# Patient Record
Sex: MALE | Race: WHITE | NOT HISPANIC OR LATINO | Employment: OTHER | URBAN - METROPOLITAN AREA
[De-identification: names, ages, dates, MRNs, and addresses within clinical notes are randomized per-mention and may not be internally consistent; named-entity substitution may affect disease eponyms.]

---

## 2019-03-07 ENCOUNTER — HOSPITAL ENCOUNTER (INPATIENT)
Facility: CLINIC | Age: 74
End: 2019-03-07
Payer: COMMERCIAL

## 2021-10-29 ENCOUNTER — APPOINTMENT (OUTPATIENT)
Dept: GENERAL RADIOLOGY | Facility: CLINIC | Age: 76
End: 2021-10-29
Attending: EMERGENCY MEDICINE
Payer: COMMERCIAL

## 2021-10-29 ENCOUNTER — HOSPITAL ENCOUNTER (EMERGENCY)
Facility: CLINIC | Age: 76
Discharge: HOME | End: 2021-10-29
Attending: EMERGENCY MEDICINE | Admitting: EMERGENCY MEDICINE
Payer: COMMERCIAL

## 2021-10-29 ENCOUNTER — APPOINTMENT (OUTPATIENT)
Dept: CT IMAGING | Facility: CLINIC | Age: 76
End: 2021-10-29
Attending: EMERGENCY MEDICINE
Payer: COMMERCIAL

## 2021-10-29 VITALS
DIASTOLIC BLOOD PRESSURE: 82 MMHG | RESPIRATION RATE: 18 BRPM | HEART RATE: 79 BPM | OXYGEN SATURATION: 97 % | SYSTOLIC BLOOD PRESSURE: 152 MMHG | TEMPERATURE: 98 F

## 2021-10-29 DIAGNOSIS — S00.03XA HEMATOMA OF SCALP, INITIAL ENCOUNTER: ICD-10-CM
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
## 2021-10-29 DIAGNOSIS — S09.90XA INJURY OF HEAD, INITIAL ENCOUNTER: ICD-10-CM

## 2021-10-29 PROCEDURE — 99285 EMERGENCY DEPT VISIT HI MDM: CPT | Mod: 25

## 2021-10-29 PROCEDURE — 73552 X-RAY EXAM OF FEMUR 2/>: CPT | Mod: RT

## 2021-10-29 PROCEDURE — 70450 CT HEAD/BRAIN W/O DYE: CPT

## 2021-10-29 PROCEDURE — 72125 CT NECK SPINE W/O DYE: CPT

## 2021-10-29 RX ORDER — GLIMEPIRIDE 1 MG/1
2 TABLET ORAL
COMMUNITY
Start: 2021-10-10

## 2021-10-29 RX ORDER — AMLODIPINE BESYLATE 5 MG/1
5 TABLET ORAL
Status: ON HOLD | COMMUNITY
Start: 2021-06-11 | End: 2023-01-11

## 2021-10-29 RX ORDER — BUPROPION HYDROCHLORIDE 300 MG/1
300 TABLET ORAL
COMMUNITY
Start: 2021-10-11

## 2021-10-29 NOTE — ED NOTES
76 year old male received in signout from Dr. Umanzor who presents to the ED w/ head injury status post fall on anticoagulation.  Please see primary note for full details. Imaging pending at time of signout resulted as noted below without significant acute traumatic abnormality. Patient ambulated in the emergency department without difficulty. Given his reassuring imaging and successful ambulation trial, at this time I feel the patient is safe for discharge.  Recommendations given regarding follow up with PCP and return to the emergency department as needed for new or worsening symptoms.  Counseled on all results, diagnosis and disposition. Patient understanding and agreeable to plan. Patient discharged in discharged condition.        XR Femur Right 2 Views   Final Result   IMPRESSION: Moderate right hip degenerative changes. No evidence of   acute fracture. Moderate knee degenerative changes. Ossific density   inferior to the patella may be calcification or ossification in the   patellar tendon versus old avulsion injury.      SHELIA SORTO MD            SYSTEM ID:  [id number]      Cervical spine CT w/o contrast   Final Result   IMPRESSION: There is normal alignment of the cervical vertebrae.   Vertebral body heights of the cervical spine are normal.   Craniocervical alignment is normal. There are no fractures of the   cervical spine.  Loss of disc space height and degenerative endplate   spurring at C5-C6. Mild-moderate facet arthropathy throughout cervical   spine. Mild degenerative spinal canal narrowing at C4-C5 and C5-C6.         Radiation dose for this scan was reduced using automated exposure   control, adjustment of the mA and/or kV according to patient size, or   iterative reconstruction technique      DUSTIN BARILLAS MD            SYSTEM ID:  [id number]      Head CT w/o contrast   Final Result   IMPRESSION: Small right frontal scalp hematoma/laceration. Diffuse   cerebral volume loss and cerebral white matter  changes consistent with   chronic small vessel ischemic disease. No evidence for acute   intracranial pathology.         Radiation dose for this scan was reduced using automated exposure   control, adjustment of the mA and/or kV according to patient size, or   iterative reconstruction technique      DUSTIN BARILLAS MD            SYSTEM ID:  [id number]                 Quincy Lima MD  10/29/21 8320

## 2021-10-29 NOTE — DISCHARGE INSTRUCTIONS
Discharge Instructions  Head Injury    You have been seen today for a head injury. Your evaluation included a history and physical examination. You may have had a CT (CAT) scan performed, though most head injuries do not require a scan. Based on this evaluation, your provider today does not feel that your head injury is serious.    Generally, every Emergency Department visit should have a follow-up clinic visit with either a primary or a specialty clinic/provider. Please follow-up as instructed by your emergency provider today.  Return to the Emergency Department if:  You are confused or you are not acting right.  Your headache gets worse or you start to have a really bad headache even with your recommended treatment plan.  You vomit (throw up) more than once.  You have a seizure.  You have trouble walking.  You have weakness or paralysis (cannot move) in an arm or a leg.  You have blood or fluid coming from your ears or nose.  You have new symptoms or anything that worries you.    Sleeping:  It is okay for you to sleep, but someone should wake you up if instructed by your provider, and someone should check on you at your usual time to wake up.     Activity:  Do not drive for at least 24 hours.  Do not drive if you have dizzy spells or trouble concentrating, or remembering things.  Do not return to any contact sports until cleared by your regular provider.     MORE INFORMATION:    Concussion:  A concussion is a minor head injury that may cause temporary problems with the way the brain works. Although concussions are important, they are generally not an emergency or a reason that a person needs to be hospitalized. Some concussion symptoms include confusion, amnesia (forgetful), nausea (sick to your stomach) and vomiting (throwing up), dizziness, fatigue, memory or concentration problems, irritability and sleep problems. For most people, concussions are mild and temporary but some will have more severe and  persistent symptoms that require on-going care and treatment.  CT Scans: Your evaluation today may have included a CT scan (CAT scan) to look for things like bleeding or a skull fracture (broken bone).  CT scans involve radiation and too many CT scans can cause serious health problems like cancer, especially in children.  Because of this, your provider may not have ordered a CT scan today if they think you are at low risk for a serious or life threatening problem.    If you were given a prescription for medicine here today, be sure to read all of the information (including the package insert) that comes with your prescription.  This will include important information about the medicine, its side effects, and any warnings that you need to know about.  The pharmacist who fills the prescription can provide more information and answer questions you may have about the medicine.  If you have questions or concerns that the pharmacist cannot address, please call or return to the Emergency Department.     Remember that you can always come back to the Emergency Department if you are not able to see your regular provider in the amount of time listed above, if you get any new symptoms, or if there is anything that worries you.

## 2021-10-29 NOTE — ED PROVIDER NOTES
History   Chief Complaint:  Fall       HPI   Jan Hernandez is a 76 year old male with a history of chronic heart failure and diabetes who presents with fall downstairs.    Patient is a 76-year-old male who is not on anticoagulation patient states he was at a restaurant and the rail in the restaurant has been loose in the past.  Patient was walking down the stairs and the real gave out he fell.  Patient did hit the right forehead.  He had some bleeding.  Patient was not allowed to get up due to trauma and the EMS was called and brought to the emergency room for assessment arrival patient is awake and alert there is a moderate hematoma and abrasion to right forehead.  Patient is mentions some right thigh pain but is able to move the leg okay he denies vomiting loss of consciousness or dizziness or lightheadedness prior to the fall..    Review of Systems  Positive for head trauma negative for loss of consciousness positive for neck pain positive for right thigh pain all the systems negative except as above    Allergies:  Metformin    Medications:  Amlodipine  Wellbutrin  Glimepiride  Aspirin  Toprol-XL    Past Medical History:    Diabetes    Chronic diastolic heart failure  Adjustment disorder  Hypertension  CKD  Dextrocardia  Crohn's Disease  Hyperlipidemia    Past Surgical History:    Cholecystectomy      Social History:  The patient was brought to the emergency department by by ambulance.    Physical Exam     Patient Vitals for the past 24 hrs:   BP Temp Pulse Resp SpO2   10/29/21 1304 (!) 142/66 98  F (36.7  C) 75 18 100 %       Physical Exam  Vitals reviewed.   HENT:      Head: Normocephalic.      Comments: There is moderate hematoma to the right frontoparietal scalp.  There is a small abrasion and hematoma associated with the scalp.       Mouth/Throat:      Mouth: Mucous membranes are moist.   Eyes:      Pupils: Pupils are equal, round, and reactive to light.   Neck:      Comments: In c-collar on arrival kept  in place for imaging.  Cardiovascular:      Rate and Rhythm: Normal rate and regular rhythm.      Pulses: Normal pulses.      Heart sounds: Normal heart sounds.   Abdominal:      General: Abdomen is flat.      Palpations: Abdomen is soft.   Musculoskeletal:      Comments: She localizes pain to the mid to distal right thigh.  There is no hematoma step-off or deformity patient able to lift the right leg without difficulty.   Skin:     Capillary Refill: Capillary refill takes less than 2 seconds.   Neurological:      General: No focal deficit present.      Mental Status: He is alert and oriented to person, place, and time.   Psychiatric:         Mood and Affect: Mood normal.           Emergency Department Course     Imaging:  Head CT w/o contrast    (Results Pending)   Cervical spine CT w/o contrast    (Results Pending)   XR Femur Right 2 Views    (Results Pending)         Emergency Department Course:  Reviewed:  I reviewed the patient's nursing notes, vitals, past medical records, Care Everywhere.     Assessments:  1340 I performed an exam of the patient and obtained history, as documented above.       Interventions:    Medications - No data to display    Disposition:  Care of the patient was transferred to my colleague Dr. Lima   pending CT results.     Impression & Plan          Medical Decision Making:  Patient presents with clearly mechanical fall on the right side.  Evidence of head injury is apparent due to age and 76 head CT was recommended as well as C-spine was not able to be cleared by examination.  Patient also had x-rays of the right thigh no no deformity or rotation or shortening was noted.  Patient was signed out to Dr. JEROD ULLOA pending the results of the imaging if negative patient is recommended ice or heat abrasion instructions and follow-up as needed.      Diagnosis:    ICD-10-CM    1. Fall, initial encounter  W19.XXXA    2. Injury of head, initial encounter  S09.90XA    3. Hematoma of scalp,  initial encounter  S00.03XA        Discharge Medications:  New Prescriptions    No medications on file       Scribe Disclosure:  I, Kimberly Bre, am serving as a scribe at 1:50 PM on 10/29/2021 to document services personally performed by Pankaj Umanzor MD based on my observations and the provider's statements to me.     October 29, 2021   [facility] Emergency Department     Pankaj Umanzor MD  10/30/21 0747

## 2021-10-29 NOTE — ED TRIAGE NOTES
Patient presents to ED via EMS. Per EMS report patient was walking down 3 steps and tripped falling hitting head on car bumper. Denies LOC. C collar placed PTA.     C/o Pain to R thigh     ABC intact   A/ox4

## 2021-10-29 NOTE — ED NOTES
Patients daughter Andie contacted and provided with an update per patients request   Pharmacy faxed request for medication refill.    Preferred pharmacy set up and verified

## 2022-12-30 ENCOUNTER — TRANSFERRED RECORDS (OUTPATIENT)
Dept: MEDSURG UNIT | Facility: CLINIC | Age: 77
End: 2022-12-30

## 2023-01-08 ENCOUNTER — APPOINTMENT (OUTPATIENT)
Dept: ULTRASOUND IMAGING | Facility: CLINIC | Age: 78
DRG: 312 | End: 2023-01-08
Attending: INTERNAL MEDICINE
Payer: COMMERCIAL

## 2023-01-08 ENCOUNTER — HOSPITAL ENCOUNTER (INPATIENT)
Facility: CLINIC | Age: 78
LOS: 4 days | Discharge: NURSING, REHAB OR LONG-TERM CARE | DRG: 312 | End: 2023-01-12
Attending: EMERGENCY MEDICINE | Admitting: INTERNAL MEDICINE
Payer: COMMERCIAL

## 2023-01-08 DIAGNOSIS — I95.1 ORTHOSTATIC HYPOTENSION: ICD-10-CM

## 2023-01-08 PROBLEM — I73.9 PERIPHERAL VASCULAR DISEASE (H): Status: ACTIVE | Noted: 2022-03-29

## 2023-01-08 PROBLEM — K50.90 CROHN'S DISEASE (H): Status: ACTIVE | Noted: 2023-01-08

## 2023-01-08 PROBLEM — N18.30 ANEMIA DUE TO STAGE 3 CHRONIC KIDNEY DISEASE (H): Status: ACTIVE | Noted: 2019-03-18

## 2023-01-08 PROBLEM — E66.01 CLASS 2 SEVERE OBESITY DUE TO EXCESS CALORIES WITH SERIOUS COMORBIDITY IN ADULT (H): Status: ACTIVE | Noted: 2019-03-08

## 2023-01-08 PROBLEM — F33.9 DEPRESSION, RECURRENT (H): Status: ACTIVE | Noted: 2022-03-29

## 2023-01-08 PROBLEM — E66.812 CLASS 2 SEVERE OBESITY DUE TO EXCESS CALORIES WITH SERIOUS COMORBIDITY IN ADULT (H): Status: ACTIVE | Noted: 2019-03-08

## 2023-01-08 PROBLEM — Q24.0 DEXTROCARDIA: Status: ACTIVE | Noted: 2023-01-08

## 2023-01-08 PROBLEM — E78.5 HYPERLIPIDEMIA WITH TARGET LOW DENSITY LIPOPROTEIN (LDL) CHOLESTEROL LESS THAN 70 MG/DL: Status: RESOLVED | Noted: 2023-01-08 | Resolved: 2023-01-08

## 2023-01-08 PROBLEM — I50.32 CHRONIC DIASTOLIC HEART FAILURE (H): Status: ACTIVE | Noted: 2020-06-11

## 2023-01-08 PROBLEM — K50.90 REGIONAL ENTERITIS (H): Status: ACTIVE | Noted: 2023-01-08

## 2023-01-08 PROBLEM — M17.0 PRIMARY OSTEOARTHRITIS OF BOTH KNEES: Status: ACTIVE | Noted: 2018-09-14

## 2023-01-08 PROBLEM — G95.9 MYELOPATHY (H): Status: ACTIVE | Noted: 2022-03-29

## 2023-01-08 PROBLEM — D63.1 ANEMIA DUE TO STAGE 3 CHRONIC KIDNEY DISEASE (H): Status: ACTIVE | Noted: 2019-03-18

## 2023-01-08 PROBLEM — E78.5 HYPERLIPIDEMIA WITH TARGET LOW DENSITY LIPOPROTEIN (LDL) CHOLESTEROL LESS THAN 70 MG/DL: Status: ACTIVE | Noted: 2023-01-08

## 2023-01-08 LAB
ALBUMIN SERPL-MCNC: 3.3 G/DL (ref 3.4–5)
ALBUMIN UR-MCNC: 50 MG/DL
ALP SERPL-CCNC: 77 U/L (ref 40–150)
ALT SERPL W P-5'-P-CCNC: 8 U/L (ref 0–70)
ANION GAP SERPL CALCULATED.3IONS-SCNC: 12 MMOL/L (ref 3–14)
APPEARANCE UR: ABNORMAL
AST SERPL W P-5'-P-CCNC: 10 U/L (ref 0–45)
BACTERIA #/AREA URNS HPF: ABNORMAL /HPF
BASOPHILS # BLD AUTO: 0.1 10E3/UL (ref 0–0.2)
BASOPHILS NFR BLD AUTO: 1 %
BILIRUB SERPL-MCNC: 0.8 MG/DL (ref 0.2–1.3)
BILIRUB UR QL STRIP: NEGATIVE
BUN SERPL-MCNC: 17 MG/DL (ref 7–30)
CALCIUM SERPL-MCNC: 9.9 MG/DL (ref 8.5–10.1)
CHLORIDE BLD-SCNC: 101 MMOL/L (ref 94–109)
CO2 SERPL-SCNC: 23 MMOL/L (ref 20–32)
COLOR UR AUTO: YELLOW
CORTIS SERPL-MCNC: 17.1 UG/DL
CREAT SERPL-MCNC: 1.6 MG/DL (ref 0.66–1.25)
EOSINOPHIL # BLD AUTO: 0.1 10E3/UL (ref 0–0.7)
EOSINOPHIL NFR BLD AUTO: 1 %
ERYTHROCYTE [DISTWIDTH] IN BLOOD BY AUTOMATED COUNT: 16.5 % (ref 10–15)
GFR SERPL CREATININE-BSD FRML MDRD: 45 ML/MIN/1.73M2
GLUCOSE BLD-MCNC: 107 MG/DL (ref 70–99)
GLUCOSE BLDC GLUCOMTR-MCNC: 122 MG/DL (ref 70–99)
GLUCOSE UR STRIP-MCNC: NEGATIVE MG/DL
HBA1C MFR BLD: 8.1 % (ref 0–5.6)
HCT VFR BLD AUTO: 41.2 % (ref 40–53)
HGB BLD-MCNC: 13.6 G/DL (ref 13.3–17.7)
HGB UR QL STRIP: NEGATIVE
HOLD SPECIMEN: NORMAL
IMM GRANULOCYTES # BLD: 0 10E3/UL
IMM GRANULOCYTES NFR BLD: 0 %
KETONES UR STRIP-MCNC: ABNORMAL MG/DL
LACTATE SERPL-SCNC: 1.5 MMOL/L (ref 0.7–2)
LEUKOCYTE ESTERASE UR QL STRIP: ABNORMAL
LYMPHOCYTES # BLD AUTO: 1.5 10E3/UL (ref 0.8–5.3)
LYMPHOCYTES NFR BLD AUTO: 20 %
MAGNESIUM SERPL-MCNC: 2 MG/DL (ref 1.6–2.3)
MCH RBC QN AUTO: 31.1 PG (ref 26.5–33)
MCHC RBC AUTO-ENTMCNC: 33 G/DL (ref 31.5–36.5)
MCV RBC AUTO: 94 FL (ref 78–100)
MONOCYTES # BLD AUTO: 0.8 10E3/UL (ref 0–1.3)
MONOCYTES NFR BLD AUTO: 11 %
MUCOUS THREADS #/AREA URNS LPF: PRESENT /LPF
NEUTROPHILS # BLD AUTO: 5.1 10E3/UL (ref 1.6–8.3)
NEUTROPHILS NFR BLD AUTO: 67 %
NITRATE UR QL: NEGATIVE
NRBC # BLD AUTO: 0 10E3/UL
NRBC BLD AUTO-RTO: 0 /100
PH UR STRIP: 5.5 [PH] (ref 5–7)
PLATELET # BLD AUTO: 245 10E3/UL (ref 150–450)
POTASSIUM BLD-SCNC: 4.1 MMOL/L (ref 3.4–5.3)
PROCALCITONIN SERPL-MCNC: 0.08 NG/ML
PROT SERPL-MCNC: 7 G/DL (ref 6.8–8.8)
RBC # BLD AUTO: 4.38 10E6/UL (ref 4.4–5.9)
RBC URINE: 3 /HPF
SODIUM SERPL-SCNC: 136 MMOL/L (ref 133–144)
SP GR UR STRIP: 1.02 (ref 1–1.03)
TRANSITIONAL EPI: 1 /HPF
TROPONIN I SERPL HS-MCNC: 18 NG/L
TSH SERPL DL<=0.005 MIU/L-ACNC: 2.29 MU/L (ref 0.4–4)
UROBILINOGEN UR STRIP-MCNC: NORMAL MG/DL
WBC # BLD AUTO: 7.6 10E3/UL (ref 4–11)
WBC CLUMPS #/AREA URNS HPF: PRESENT /HPF
WBC URINE: >182 /HPF

## 2023-01-08 PROCEDURE — 83605 ASSAY OF LACTIC ACID: CPT | Performed by: INTERNAL MEDICINE

## 2023-01-08 PROCEDURE — 258N000003 HC RX IP 258 OP 636: Performed by: EMERGENCY MEDICINE

## 2023-01-08 PROCEDURE — 250N000013 HC RX MED GY IP 250 OP 250 PS 637: Performed by: INTERNAL MEDICINE

## 2023-01-08 PROCEDURE — 99223 1ST HOSP IP/OBS HIGH 75: CPT | Performed by: INTERNAL MEDICINE

## 2023-01-08 PROCEDURE — 250N000011 HC RX IP 250 OP 636: Performed by: INTERNAL MEDICINE

## 2023-01-08 PROCEDURE — 83735 ASSAY OF MAGNESIUM: CPT | Performed by: INTERNAL MEDICINE

## 2023-01-08 PROCEDURE — 84484 ASSAY OF TROPONIN QUANT: CPT | Performed by: INTERNAL MEDICINE

## 2023-01-08 PROCEDURE — 84145 PROCALCITONIN (PCT): CPT | Performed by: INTERNAL MEDICINE

## 2023-01-08 PROCEDURE — 83036 HEMOGLOBIN GLYCOSYLATED A1C: CPT | Performed by: INTERNAL MEDICINE

## 2023-01-08 PROCEDURE — 81001 URINALYSIS AUTO W/SCOPE: CPT | Performed by: EMERGENCY MEDICINE

## 2023-01-08 PROCEDURE — 36415 COLL VENOUS BLD VENIPUNCTURE: CPT | Performed by: INTERNAL MEDICINE

## 2023-01-08 PROCEDURE — 96360 HYDRATION IV INFUSION INIT: CPT

## 2023-01-08 PROCEDURE — 96361 HYDRATE IV INFUSION ADD-ON: CPT

## 2023-01-08 PROCEDURE — 84443 ASSAY THYROID STIM HORMONE: CPT | Performed by: INTERNAL MEDICINE

## 2023-01-08 PROCEDURE — 93005 ELECTROCARDIOGRAM TRACING: CPT

## 2023-01-08 PROCEDURE — 87086 URINE CULTURE/COLONY COUNT: CPT | Performed by: EMERGENCY MEDICINE

## 2023-01-08 PROCEDURE — 80053 COMPREHEN METABOLIC PANEL: CPT | Performed by: EMERGENCY MEDICINE

## 2023-01-08 PROCEDURE — 85025 COMPLETE CBC W/AUTO DIFF WBC: CPT | Performed by: EMERGENCY MEDICINE

## 2023-01-08 PROCEDURE — 93880 EXTRACRANIAL BILAT STUDY: CPT

## 2023-01-08 PROCEDURE — 258N000003 HC RX IP 258 OP 636: Performed by: INTERNAL MEDICINE

## 2023-01-08 PROCEDURE — 36415 COLL VENOUS BLD VENIPUNCTURE: CPT | Performed by: EMERGENCY MEDICINE

## 2023-01-08 PROCEDURE — 120N000001 HC R&B MED SURG/OB

## 2023-01-08 PROCEDURE — 99285 EMERGENCY DEPT VISIT HI MDM: CPT | Mod: 25

## 2023-01-08 PROCEDURE — 82533 TOTAL CORTISOL: CPT | Performed by: INTERNAL MEDICINE

## 2023-01-08 RX ORDER — ONDANSETRON 2 MG/ML
4 INJECTION INTRAMUSCULAR; INTRAVENOUS EVERY 6 HOURS PRN
Status: DISCONTINUED | OUTPATIENT
Start: 2023-01-08 | End: 2023-01-12 | Stop reason: HOSPADM

## 2023-01-08 RX ORDER — ONDANSETRON 4 MG/1
4 TABLET, ORALLY DISINTEGRATING ORAL EVERY 6 HOURS PRN
Status: DISCONTINUED | OUTPATIENT
Start: 2023-01-08 | End: 2023-01-12 | Stop reason: HOSPADM

## 2023-01-08 RX ORDER — PANTOPRAZOLE SODIUM 40 MG/1
40 TABLET, DELAYED RELEASE ORAL
Status: DISCONTINUED | OUTPATIENT
Start: 2023-01-09 | End: 2023-01-12 | Stop reason: HOSPADM

## 2023-01-08 RX ORDER — ACETAMINOPHEN 325 MG/1
650 TABLET ORAL EVERY 6 HOURS PRN
Status: DISCONTINUED | OUTPATIENT
Start: 2023-01-08 | End: 2023-01-12 | Stop reason: HOSPADM

## 2023-01-08 RX ORDER — BUPROPION HYDROCHLORIDE 150 MG/1
300 TABLET ORAL DAILY
Status: DISCONTINUED | OUTPATIENT
Start: 2023-01-08 | End: 2023-01-12 | Stop reason: HOSPADM

## 2023-01-08 RX ORDER — SULFASALAZINE 500 MG/1
1000 TABLET ORAL 3 TIMES DAILY
COMMUNITY

## 2023-01-08 RX ORDER — ESCITALOPRAM OXALATE 10 MG/1
10 TABLET ORAL DAILY
Status: DISCONTINUED | OUTPATIENT
Start: 2023-01-08 | End: 2023-01-12 | Stop reason: HOSPADM

## 2023-01-08 RX ORDER — VIT A/VIT C/VIT E/ZINC/COPPER 2148-113
1 TABLET ORAL 2 TIMES DAILY
COMMUNITY

## 2023-01-08 RX ORDER — VIT C/E/ZN/COPPR/LUTEIN/ZEAXAN 60 MG-6 MG
1 CAPSULE ORAL 2 TIMES DAILY
Status: DISCONTINUED | OUTPATIENT
Start: 2023-01-08 | End: 2023-01-12 | Stop reason: HOSPADM

## 2023-01-08 RX ORDER — NALOXONE HYDROCHLORIDE 0.4 MG/ML
0.2 INJECTION, SOLUTION INTRAMUSCULAR; INTRAVENOUS; SUBCUTANEOUS
Status: DISCONTINUED | OUTPATIENT
Start: 2023-01-08 | End: 2023-01-12 | Stop reason: HOSPADM

## 2023-01-08 RX ORDER — AMOXICILLIN 250 MG
1 CAPSULE ORAL 2 TIMES DAILY PRN
Status: DISCONTINUED | OUTPATIENT
Start: 2023-01-08 | End: 2023-01-12 | Stop reason: HOSPADM

## 2023-01-08 RX ORDER — POLYETHYLENE GLYCOL 3350 17 G/17G
17 POWDER, FOR SOLUTION ORAL DAILY PRN
Status: DISCONTINUED | OUTPATIENT
Start: 2023-01-08 | End: 2023-01-12 | Stop reason: HOSPADM

## 2023-01-08 RX ORDER — ACETAMINOPHEN 650 MG/1
650 SUPPOSITORY RECTAL EVERY 6 HOURS PRN
Status: DISCONTINUED | OUTPATIENT
Start: 2023-01-08 | End: 2023-01-12 | Stop reason: HOSPADM

## 2023-01-08 RX ORDER — SODIUM CHLORIDE 9 MG/ML
INJECTION, SOLUTION INTRAVENOUS CONTINUOUS
Status: DISCONTINUED | OUTPATIENT
Start: 2023-01-08 | End: 2023-01-09

## 2023-01-08 RX ORDER — ONDANSETRON 4 MG/1
4 TABLET, FILM COATED ORAL EVERY 8 HOURS PRN
COMMUNITY

## 2023-01-08 RX ORDER — ASPIRIN 81 MG/1
81 TABLET ORAL DAILY
Status: DISCONTINUED | OUTPATIENT
Start: 2023-01-08 | End: 2023-01-12 | Stop reason: HOSPADM

## 2023-01-08 RX ORDER — POLYETHYLENE GLYCOL 3350 17 G/17G
1 POWDER, FOR SOLUTION ORAL DAILY PRN
COMMUNITY

## 2023-01-08 RX ORDER — PROCHLORPERAZINE 25 MG
12.5 SUPPOSITORY, RECTAL RECTAL EVERY 12 HOURS PRN
Status: DISCONTINUED | OUTPATIENT
Start: 2023-01-08 | End: 2023-01-12 | Stop reason: HOSPADM

## 2023-01-08 RX ORDER — ESCITALOPRAM OXALATE 10 MG/1
10 TABLET ORAL DAILY
COMMUNITY

## 2023-01-08 RX ORDER — SULFASALAZINE 500 MG/1
1000 TABLET ORAL 3 TIMES DAILY
Status: DISCONTINUED | OUTPATIENT
Start: 2023-01-08 | End: 2023-01-12 | Stop reason: HOSPADM

## 2023-01-08 RX ORDER — MIDODRINE HYDROCHLORIDE 2.5 MG/1
2.5 TABLET ORAL
Status: DISCONTINUED | OUTPATIENT
Start: 2023-01-08 | End: 2023-01-09

## 2023-01-08 RX ORDER — NICOTINE POLACRILEX 4 MG
15-30 LOZENGE BUCCAL
Status: DISCONTINUED | OUTPATIENT
Start: 2023-01-08 | End: 2023-01-12 | Stop reason: HOSPADM

## 2023-01-08 RX ORDER — AMOXICILLIN 250 MG
2 CAPSULE ORAL 2 TIMES DAILY PRN
Status: DISCONTINUED | OUTPATIENT
Start: 2023-01-08 | End: 2023-01-12 | Stop reason: HOSPADM

## 2023-01-08 RX ORDER — PROCHLORPERAZINE MALEATE 5 MG
5 TABLET ORAL EVERY 6 HOURS PRN
Status: DISCONTINUED | OUTPATIENT
Start: 2023-01-08 | End: 2023-01-12 | Stop reason: HOSPADM

## 2023-01-08 RX ORDER — METOCLOPRAMIDE HYDROCHLORIDE 5 MG/ML
5 INJECTION INTRAMUSCULAR; INTRAVENOUS ONCE
Status: DISCONTINUED | OUTPATIENT
Start: 2023-01-08 | End: 2023-01-08

## 2023-01-08 RX ORDER — DEXTROSE MONOHYDRATE 25 G/50ML
25-50 INJECTION, SOLUTION INTRAVENOUS
Status: DISCONTINUED | OUTPATIENT
Start: 2023-01-08 | End: 2023-01-12 | Stop reason: HOSPADM

## 2023-01-08 RX ORDER — NALOXONE HYDROCHLORIDE 0.4 MG/ML
0.4 INJECTION, SOLUTION INTRAMUSCULAR; INTRAVENOUS; SUBCUTANEOUS
Status: DISCONTINUED | OUTPATIENT
Start: 2023-01-08 | End: 2023-01-12 | Stop reason: HOSPADM

## 2023-01-08 RX ORDER — OXYCODONE HYDROCHLORIDE 5 MG/1
5 TABLET ORAL EVERY 4 HOURS PRN
Status: DISCONTINUED | OUTPATIENT
Start: 2023-01-08 | End: 2023-01-12 | Stop reason: HOSPADM

## 2023-01-08 RX ORDER — ROSUVASTATIN CALCIUM 40 MG/1
40 TABLET, COATED ORAL DAILY
COMMUNITY

## 2023-01-08 RX ORDER — ROSUVASTATIN CALCIUM 20 MG/1
40 TABLET, COATED ORAL DAILY
Status: DISCONTINUED | OUTPATIENT
Start: 2023-01-08 | End: 2023-01-12 | Stop reason: HOSPADM

## 2023-01-08 RX ORDER — LIDOCAINE 40 MG/G
CREAM TOPICAL
Status: DISCONTINUED | OUTPATIENT
Start: 2023-01-08 | End: 2023-01-12 | Stop reason: HOSPADM

## 2023-01-08 RX ORDER — CEFTRIAXONE 1 G/1
1 INJECTION, POWDER, FOR SOLUTION INTRAMUSCULAR; INTRAVENOUS EVERY 24 HOURS
Status: DISCONTINUED | OUTPATIENT
Start: 2023-01-08 | End: 2023-01-12 | Stop reason: HOSPADM

## 2023-01-08 RX ADMIN — ESCITALOPRAM OXALATE 10 MG: 10 TABLET ORAL at 19:41

## 2023-01-08 RX ADMIN — ASPIRIN 81 MG: 81 TABLET, COATED ORAL at 19:41

## 2023-01-08 RX ADMIN — SODIUM CHLORIDE: 9 INJECTION, SOLUTION INTRAVENOUS at 19:38

## 2023-01-08 RX ADMIN — CARBIDOPA AND LEVODOPA 2.5 MG: 50; 200 TABLET, EXTENDED RELEASE ORAL at 19:41

## 2023-01-08 RX ADMIN — Medication 1 CAPSULE: at 21:14

## 2023-01-08 RX ADMIN — CEFTRIAXONE SODIUM 1 G: 1 INJECTION, POWDER, FOR SOLUTION INTRAMUSCULAR; INTRAVENOUS at 22:35

## 2023-01-08 RX ADMIN — SULFASALAZINE 1000 MG: 500 TABLET ORAL at 21:14

## 2023-01-08 RX ADMIN — ROSUVASTATIN CALCIUM 40 MG: 20 TABLET, FILM COATED ORAL at 19:41

## 2023-01-08 RX ADMIN — SODIUM CHLORIDE 1000 ML: 9 INJECTION, SOLUTION INTRAVENOUS at 10:19

## 2023-01-08 RX ADMIN — BUPROPION HYDROCHLORIDE 300 MG: 150 TABLET, FILM COATED, EXTENDED RELEASE ORAL at 19:41

## 2023-01-08 RX ADMIN — SODIUM CHLORIDE 1000 ML: 9 INJECTION, SOLUTION INTRAVENOUS at 11:45

## 2023-01-08 ASSESSMENT — ACTIVITIES OF DAILY LIVING (ADL)
ADLS_ACUITY_SCORE: 20
ADLS_ACUITY_SCORE: 35
ADLS_ACUITY_SCORE: 35
ADLS_ACUITY_SCORE: 20
ADLS_ACUITY_SCORE: 35
ADLS_ACUITY_SCORE: 35
ADLS_ACUITY_SCORE: 38

## 2023-01-08 ASSESSMENT — ENCOUNTER SYMPTOMS
SHORTNESS OF BREATH: 1
PALPITATIONS: 0
VOMITING: 0
COUGH: 0
DIARRHEA: 0
WEAKNESS: 1
APPETITE CHANGE: 1
NAUSEA: 0
DIZZINESS: 1
DIAPHORESIS: 1

## 2023-01-08 NOTE — H&P
Admitted: 01/08/2023    HISTORY OF PRESENT ILLNESS:  This is a 77-year-old male with history of diabetes mellitus type 2, depression, diastolic congestive heart failure, chronic kidney disease stage III, Crohn's disease, hypertension, dextrocardia came to the ER with complaint of dizziness and lightheadedness.    According to the patient has been getting dizzy and lightheaded for  about a month.  Three weeks ago he fell down.  When he got up, he felt dizzy and lightheaded and his legs gave up and fell down.  He called the ambulance and was taken to the emergency room at [facility] in [place].  He was admitted there.  His metoprolol was discontinued at that time, he was having diarrhea from his Crohn's disease.  They treated him for his Crohn's with prednisone and sulfasalazine and his diarrhea resolved since then.  He was feeling well and was discharged home.    The patient told me that his dizziness, lightheadedness improved when he was discharged from the hospital, but again getting more dizzy and lightheaded now.  To the point that he cannot do anything.  Now, he does get dizzy and lightheaded and sometimes feel short of breath when he continues to walk.  In the ED, when he came in he did significantly dropped his blood pressure on sitting from 120 to 60 systolic and was tachycardic in one-teens and 130.  He was given IV fluid boluses in the ED and his blood pressure improved with that, but still getting quite a bit of orthostatic hypotension, even on sitting from 111 systolic to 80s diastolic.    Apparently when he was discharged from the hospital the only blood pressure medication that he was on in the records in the [facility] was metoprolol and he was taken off on the metoprolol.  He completed his prednisone taper as well, but I did review his medication and he continued to take amlodipine at home.    At this time, feeling somewhat better.  Denies any chest pain, shortness of breath, orthopnea, PND,  palpitation or dysuria, hematuria, constipation, diarrhea.  No headache, never passed out.  Never hit his head.  No dysuria, hematuria, constipation, diarrhea.  He does have some nocturia.  Rest of the review of system is negative.  The patient does have history of dextrocardia.    ASSESSMENT AND PLAN:    1.  Significant orthostatic hypotension:  This is a 77-year-old male with history of diabetes for a long time, uncontrolled with A1c of 8.1.  Does have peripheral neuropathy. The causes of orthostatic hypotension is uncertain at this time.  I think initially may be because of diarrhea and volume depletion.  He was getting orthostatic hypotension, which is improved, but apparently he continued taking his amlodipine at home.  He discontinued his metoprolol though.  I think that most likely the cause is he has been getting more symptomatic because he is still taking his amlodipine.  At this time, I will discontinue his amlodipine.  I took his medication off his bag and I asked him to keep it separately.  Other causes most likely autonomic neuropathy can cause that as well.  For now, we will treat him symptomatically though.  He already received 2 liters IV bolus.  I will keep him on IV normal saline at 100 mL per hour for more 10 hours.  I will put compression stockings thigh high, start him on midodrine 5 mg 3 times a day.  We will check echocardiogram.  We will do carotid ultrasound.  Serial troponins.  We will have Cardiology evaluate the patient as well.  Lifestyle modification as well as he needs to wait in between changing his positions.  Discussed in detail with him.  If he continues to be hypotensive on getting up on midodrine, Florinef can be added on as well.  I will discontinue his antihypertensive medications, both metoprolol and amlodipine at this time.  Will check his cortisol now and in the morning.  2.  Diabetes mellitus type 2:  He is on glimepiride.  We will hold the glimepiride.  Keep him on sliding  scale insulin for correction and hypoglycemia protocol.  3.  Chronic kidney disease stage III:  Baseline creatinine 1.4-1.5, slightly high, 1.6 today, mostly because of orthostatic hypotension.  We will keep him on IV fluids and recheck his labs in the morning.  4.  History of diastolic congestive heart failure:  Does not look like in any acute exacerbation at this time.  We do not have any echocardiogram in our records or a recent echo in the Care Everywhere, we will do the echocardiogram as a workup for his orthostatic as well.  5.  Hyperlipidemia, on Crestor:  We will continue with that.  6.  History of Crohn's disease:  He has been much better controlled at this time.  He was recently on prednisone.  Not on any prednisone anymore.  Continue with sulfasalazine.  We will check his cortisol level now and in the morning as well.    7.  Hypertension:  Apparently continued to take amlodipine, despite significant orthostatic hypotension, we will discontinue that.  I will start him on midodrine.  There is risk of supine hypertension.  So recommended to have his head of the bed, always elevated to 30-40 degrees most of the time, even when sleeping.    DEEP THROMBOSIS PROPHYLAXIS:  SCDs.    CODE STATUS:  Full code.    The case discussed with ED physician and the nursing staff taking care of the patient.    Joe Giraldo MD        D: 2023   T: 2023   MT: ARGENIS    Name:     YAA BYRD  MRN:      [id number]        Account:     [id number]   :      1945           Admitted:    2023       Document: [id number]    cc:  Michele Sargent MD

## 2023-01-08 NOTE — ED NOTES
[facility]  ED Nurse Handoff Report    ED Chief complaint: Dizziness      ED Diagnosis:   Final diagnoses:   Orthostatic hypotension       Code Status: Full Code    Allergies:   Allergies   Allergen Reactions    Metformin Diarrhea       Patient Story: Patient recently having issues with dizziness for the last few weeks. Was hospitalized at [facility]. Patient has had serial orthostatics and is positive for orthostatic hypotension. Will be admitted for obs.   Focused Assessment:    Neuro: WDL   Cards: Previous heart transplant.   Pulm: WDL     Treatments and/or interventions provided: 2L fluids, oral rehydration   Patient's response to treatments and/or interventions:     To be done/followed up on inpatient unit:      Does this patient have any cognitive concerns?:  none    Activity level - Baseline/Home:  Independent  Activity Level - Current:   Stand with Assist    Patient's Preferred language: English   Needed?: No    Isolation: None  Infection: Not Applicable  Patient tested for COVID 19 prior to admission: NO  Bariatric?: No    Vital Signs:   Vitals:    01/08/23 1200 01/08/23 1401 01/08/23 1403 01/08/23 1405   BP: 113/70 (S) 109/65 (S) (!) 81/40 111/57   Pulse: 104 87 101 107   Resp: 20 10 17 30   Temp:       TempSrc:       SpO2:       Weight:       Height:           Cardiac Rhythm:     Was the PSS-3 completed:   Yes  What interventions are required if any?               Family Comments: Daughter is ICU flying squad RN.   OBS brochure/video discussed/provided to patient/family: No              Name of person given brochure if not patient:               Relationship to patient:     For the majority of the shift this patient's behavior was Green.   Behavioral interventions performed were none.    ED NURSE PHONE NUMBER: *[phone]

## 2023-01-08 NOTE — H&P
[facility]    History and Physical  Hospitalist       Date of Admission:  1/8/2023    Assessment & Plan      This is a 77-year-old male with history of diabetes mellitus type 2, depression, diastolic congestive heart failure, chronic kidney disease stage III, Crohn's disease, hypertension, dextrocardia came to the ER with complaint of dizziness and lightheadedness.      ASSESSMENT AND PLAN:    1.  Significant orthostatic hypotension:  This is a 77-year-old male with history of diabetes for a long time, uncontrolled with A1c of 8.1.  Does have peripheral neuropathy. The causes of orthostatic hypotension is uncertain at this time.  I think initially may be because of diarrhea and volume depletion.  He was getting orthostatic hypotension, which is improved, but apparently he continued taking his amlodipine at home.  He discontinued his metoprolol though.  I think that most likely the cause is he has been getting more symptomatic because he is still taking his amlodipine.  At this time, I will discontinue his amlodipine.  I took his medication off his bag and I asked him to keep it separately.  Other causes most likely autonomic neuropathy can cause that as well.  For now, we will treat him symptomatically though.  He already received 2 liters IV bolus.  I will keep him on IV normal saline at 100 mL per hour for more 10 hours.  I will put compression stockings thigh high, start him on midodrine 5 mg 3 times a day.  We will check echocardiogram.  We will do carotid ultrasound.  Serial troponins.  We will have Cardiology evaluate the patient as well.  Lifestyle modification as well as he needs to wait in between changing his positions.  Discussed in detail with him.  If he continues to be hypotensive on getting up on midodrine, Florinef can be added on as well.  I will discontinue his antihypertensive medications, both metoprolol and amlodipine at this time.  Will check his cortisol now and in  the morning.  2.  Diabetes mellitus type 2:  He is on glimepiride.  We will hold the glimepiride.  Keep him on sliding scale insulin for correction and hypoglycemia protocol.  3.  Chronic kidney disease stage III:  Baseline creatinine 1.4-1.5, slightly high, 1.6 today, mostly because of orthostatic hypotension.  We will keep him on IV fluids and recheck his labs in the morning.  4.  History of diastolic congestive heart failure:  Does not look like in any acute exacerbation at this time.  We do not have any echocardiogram in our records or a recent echo in the Care Everywhere, we will do the echocardiogram as a workup for his orthostatic as well.  5.  Hyperlipidemia, on Crestor:  We will continue with that.  6.  History of Crohn's disease:  He has been much better controlled at this time.  He was recently on prednisone.  Not on any prednisone anymore.  Continue with sulfasalazine.  We will check his cortisol level now and in the morning as well.    7.  Hypertension:  Apparently continued to take amlodipine, despite significant orthostatic hypotension, we will discontinue that.  I will start him on midodrine.  There is risk of supine hypertension.  So recommended to have his head of the bed, always elevated to 30-40 degrees most of the time, even when sleeping.    DEEP THROMBOSIS PROPHYLAXIS:  SCDs.    CODE STATUS:  Full code.    The case discussed with ED physician and the nursing staff taking care of the patient.    Joe Giraldo MD  DVT Prophylaxis: Pneumatic Compression Devices  Code Status: Full Code    Disposition: Expected discharge in 2 days once stable    Joe Giraldo MD, MD    Primary Care Physician   Michele Sargent    Chief Complaint   Dizzy, lightheaded when get up     History is obtained from the patient    History of Present Illness   Admitted: 01/08/2023    HISTORY OF PRESENT ILLNESS:  This is a 77-year-old male with history of diabetes mellitus type 2, depression, diastolic congestive heart failure,  chronic kidney disease stage III, Crohn's disease, hypertension, dextrocardia came to the ER with complaint of dizziness and lightheadedness.    According to the patient has been getting dizzy and lightheaded for  about a month.  Three weeks ago he fell down.  When he got up, he felt dizzy and lightheaded and his legs gave up and fell down.  He called the ambulance and was taken to the emergency room at [facility] in [place].  He was admitted there.  His metoprolol was discontinued at that time, he was having diarrhea from his Crohn's disease.  They treated him for his Crohn's with prednisone and sulfasalazine and his diarrhea resolved since then.  He was feeling well and was discharged home.    The patient told me that his dizziness, lightheadedness improved when he was discharged from the hospital, but again getting more dizzy and lightheaded now.  To the point that he cannot do anything.  Now, he does get dizzy and lightheaded and sometimes feel short of breath when he continues to walk.  In the ED, when he came in he did significantly dropped his blood pressure on sitting from 120 to 60 systolic and was tachycardic in one-teens and 130.  He was given IV fluid boluses in the ED and his blood pressure improved with that, but still getting quite a bit of orthostatic hypotension, even on sitting from 111 systolic to 80s diastolic.    Apparently when he was discharged from the hospital the only blood pressure medication that he was on in the records in the [facility] was metoprolol and he was taken off on the metoprolol.  He completed his prednisone taper as well, but I did review his medication and he continued to take amlodipine at home.    At this time, feeling somewhat better.  Denies any chest pain, shortness of breath, orthopnea, PND, palpitation or dysuria, hematuria, constipation, diarrhea.  No headache, never passed out.  Never hit his head.  No dysuria, hematuria, constipation, diarrhea.  He does have  some nocturia.  Rest of the review of system is negative.  The patient does have history of dextrocardia.      Past Medical History    I have reviewed this patient's medical history and updated it with pertinent information if needed.   Past Medical History:   Diagnosis Date     Anemia due to stage 3 chronic kidney disease (H) 3/18/2019     Chronic diastolic heart failure (H) 6/11/2020     Crohn's disease (H) 1/8/2023     Dextrocardia 1/8/2023    Formatting of this note might be different from the original. FEb 2019.  See echo from [facility].  Very difficult exam but LV function est 60%     Diabetes (H)      Hyperlipidemia with target low density lipoprotein (LDL) cholesterol less than 70 mg/dL 1/8/2023    Formatting of this note might be different from the original. Myalgias with atorvastatin.     Myelopathy (H) 3/29/2022     Peripheral vascular disease (H) 3/29/2022    Formatting of this note might be different from the original. Does not check at home.     Type 2 diabetes mellitus, controlled (H) 6/8/2006    Formatting of this note might be different from the original. LW Onset:  13Avo14 ; DM Type2       Past Surgical History   I have reviewed this patient's surgical history and updated it with pertinent information if needed.  Past Surgical History:   Procedure Laterality Date     CHOLECYSTECTOMY         Prior to Admission Medications   Prior to Admission Medications   Prescriptions Last Dose Informant Patient Reported? Taking?   Multiple Vitamins-Minerals (PRESERVISION AREDS) TABS 1/7/2023 at PM Self, Pharmacy Yes Yes   Sig: Take 1 tablet by mouth 2 times daily   amLODIPine (NORVASC) 5 MG tablet 1/7/2023 at AM Self, Pharmacy Yes Yes   Sig: Take 5 mg by mouth   aspirin (ASA) 81 MG EC tablet Past Month Self, Pharmacy Yes Yes   Sig: Take 81 mg by mouth daily   buPROPion (WELLBUTRIN XL) 300 MG 24 hr tablet 1/7/2023 at AM Self, Pharmacy Yes Yes   Sig: Take 300 mg by mouth   escitalopram (LEXAPRO) 10 MG tablet 1/7/2023  at PM Self, Pharmacy Yes Yes   Sig: Take 10 mg by mouth daily   glimepiride (AMARYL) 1 MG tablet 1/7/2023 at AM Self, Pharmacy Yes Yes   Sig: Take 2 mg by mouth every morning (before breakfast)   omeprazole (PRILOSEC) 20 MG DR capsule 1/7/2023 at AM Self, Pharmacy Yes Yes   Sig: Take 20 mg by mouth daily   ondansetron (ZOFRAN) 4 MG tablet Past Month at PRN Self, Pharmacy Yes Yes   Sig: Take 4 mg by mouth every 8 hours as needed for nausea   polyethylene glycol (MIRALAX) 17 g packet Past Week Self, Pharmacy Yes Yes   Sig: Take 1 packet by mouth daily as needed for constipation   rosuvastatin (CRESTOR) 40 MG tablet 1/7/2023 at PM Self, Pharmacy Yes Yes   Sig: Take 40 mg by mouth daily   sulfaSALAzine (AZULFIDINE) 500 MG tablet 1/7/2023 at PM Self, Pharmacy Yes Yes   Sig: Take 1,000 mg by mouth 3 times daily      Facility-Administered Medications: None     Allergies   Allergies   Allergen Reactions     Metformin Diarrhea       Social History   I have reviewed this patient's social history and updated it with pertinent information if needed. Jan Hernandez  reports that he has quit smoking. His smoking use included cigarettes. He has never used smokeless tobacco. He reports that he does not currently use alcohol. He reports that he does not currently use drugs.    Family History   I have reviewed this patient's family history and updated it with pertinent information if needed.   History reviewed. No pertinent family history.    Review of Systems   CONSTITUTIONAL:  positive for  fatigue  EYES:  negative  HEENT:  negative  RESPIRATORY:  negative  CARDIOVASCULAR:  positive for  Dizzy and lightheaded   GASTROINTESTINAL:  negative  GENITOURINARY:  negative  INTEGUMENT/BREAST:  negative  HEMATOLOGIC/LYMPHATIC:  negative  ALLERGIC/IMMUNOLOGIC:  negative  ENDOCRINE:  negative  MUSCULOSKELETAL:  negative  NEUROLOGICAL:  positive for dizziness  BEHAVIOR/PSYCH:  negative    Physical Exam   Temp: 99.3  F (37.4  C) Temp src: Oral  BP: 111/57 Pulse: 107   Resp: 30 SpO2: 97 % O2 Device: None (Room air)    Vital Signs with Ranges  Temp:  [99.3  F (37.4  C)] 99.3  F (37.4  C)  Pulse:  [] 107  Resp:  [10-30] 30  BP: ()/(40-76) 111/57  SpO2:  [95 %-99 %] 97 %  222 lbs 0 oz    Constitutional: Awake, alert, cooperative, no apparent distress.  Eyes: Conjunctiva and pupils examined and normal.  HEENT: Moist mucous membranes, normal dentition.  Respiratory: Clear to auscultation bilaterally, no crackles or wheezing.  Cardiovascular: Regular rate and rhythm, normal S1 and S2, and no murmur noted.  GI: Soft, non-distended, non-tender, normal bowel sounds.  Lymph/Hematologic: No anterior cervical or supraclavicular adenopathy.  Skin: No rashes, no cyanosis, no edema.  Musculoskeletal: No joint swelling, erythema or tenderness.  Neurologic: Cranial nerves 2-12 intact, normal strength and sensation.  Psychiatric: Alert, oriented to person, place and time, no obvious anxiety or depression.    Data   Data reviewed today:  I personally reviewed the EKG tracing Sinus rhythm with Premature supraventricular complexes Right ventricular hypertrophy  Inferior infarct , age undetermined Anterolateral infarct , age undetermined   Abnormal ECG (patient has dextrocardia)     Recent Labs   Lab 01/08/23  1009   WBC 7.6   HGB 13.6   MCV 94         POTASSIUM 4.1   CHLORIDE 101   CO2 23   BUN 17   CR 1.60*   ANIONGAP 12   LORENZO 9.9   *   ALBUMIN 3.3*   PROTTOTAL 7.0   BILITOTAL 0.8   ALKPHOS 77   ALT 8   AST 10       No results found for this or any previous visit (from the past 24 hour(s)).

## 2023-01-08 NOTE — PHARMACY-ADMISSION MEDICATION HISTORY
Pharmacy Medication History  Admission medication history interview status for the 1/8/2023  admission is complete. See EPIC admission navigator for prior to admission medications     Location of Interview: Patient room  Medication history sources: Patient, Surescripts, Care Everywhere and Prescription bottles patient brought in with him.     Significant changes made to the medication list:  Added most all meds listed below.   Changed: aspirin to baby aspirin vs full strength    In the past week, patient estimated taking medication this percent of the time: greater than 90%    Additional medication history information:   Some non compliance with baby aspirin as ran out a few weeks ago, but should be taking.     Medication reconciliation completed by provider prior to medication history? No    Time spent in this activity: 25 minutes    Prior to Admission medications    Medication Sig Last Dose Taking? Auth Provider Long Term End Date   amLODIPine (NORVASC) 5 MG tablet Take 5 mg by mouth 1/7/2023 at AM Yes Reported, Patient     aspirin (ASA) 81 MG EC tablet Take 81 mg by mouth daily Past Month Yes Reported, Patient     buPROPion (WELLBUTRIN XL) 300 MG 24 hr tablet Take 300 mg by mouth 1/7/2023 at AM Yes Reported, Patient     escitalopram (LEXAPRO) 10 MG tablet Take 10 mg by mouth daily 1/7/2023 at PM Yes Unknown, Entered By History Yes    glimepiride (AMARYL) 1 MG tablet Take 2 mg by mouth every morning (before breakfast) 1/7/2023 at AM Yes Reported, Patient Yes    Multiple Vitamins-Minerals (PRESERVISION AREDS) TABS Take 1 tablet by mouth 2 times daily 1/7/2023 at PM Yes Unknown, Entered By History     omeprazole (PRILOSEC) 20 MG DR capsule Take 20 mg by mouth daily 1/7/2023 at AM Yes Unknown, Entered By History     ondansetron (ZOFRAN) 4 MG tablet Take 4 mg by mouth every 8 hours as needed for nausea Past Month at PRN Yes Unknown, Entered By History     polyethylene glycol (MIRALAX) 17 g packet Take 1 packet by  mouth daily as needed for constipation Past Week Yes Unknown, Entered By History     rosuvastatin (CRESTOR) 40 MG tablet Take 40 mg by mouth daily 1/7/2023 at PM Yes Unknown, Entered By History Yes    sulfaSALAzine (AZULFIDINE) 500 MG tablet Take 1,000 mg by mouth 3 times daily 1/7/2023 at PM Yes Unknown, Entered By History         The information provided in this note is only as accurate as the sources available at the time of update(s)

## 2023-01-08 NOTE — ED TRIAGE NOTES
Dizziness lightheaded for month - pt gets marietta legs      sllight SOB denies chest pain        Triage Assessment     Row Name 01/08/23 0936       Triage Assessment (Adult)    Airway WDL WDL       Respiratory WDL    Respiratory WDL WDL       Cardiac WDL    Cardiac WDL WDL       Cognitive/Neuro/Behavioral WDL    Cognitive/Neuro/Behavioral WDL WDL

## 2023-01-08 NOTE — ED PROVIDER NOTES
History     Chief Complaint:  Dizziness    The history is provided by the patient.      Jan Hernandez is a 77 year old male with history of diabetes, dextrocardia, and Crohn's disease,  who presents with dizziness. The patient was at [place] ED with orthostatic dizziness on 12/26/22. In the time since, he says he has been feeling improved, and felt particularly well this past week. However, starting last night he began to feel dizzy again whenever he stands up, along with some associated exertional shortness of breath and weakness. He endorses decreased appetite throughout the past couple of days. His last meal was breakfast yesterday, for which he had a banana and tow pieces of toast. He is continuing to drink fluids, and says he is urinating every two hours or so, but says it seems concentrated. Also felt diaphoretic last night while falling asleep. Patient denies any diarrhea, nausea, vomiting, chest pain, perceivable palpitations, or cough. He notes that his metoprolol was recently discontinued.     Independent Historian: yes     Review of External Notes: none     ROS:  Review of Systems   Constitutional: Positive for appetite change and diaphoresis.   Respiratory: Positive for shortness of breath. Negative for cough.    Cardiovascular: Negative for chest pain and palpitations.   Gastrointestinal: Negative for diarrhea, nausea and vomiting.   Neurological: Positive for dizziness and weakness.   All other systems reviewed and are negative.    Allergies:  Metformin     Medications:    Norvasc  Wellbutrin  Amaryl   Zofran    Past Medical History:    Type II diabetes  Myelopathy  Depression  peripheral vascular disease   Chronic diastolic heart failure  Stage III CKD  Primary osteoarthritis, both knees  Hypertension  Dextrocardia  Crohn's disease  Hyperlipidemia   vitamin D deficiency   Obesity   Portal vein thrombosis   Right pleural effusion   Renal insufficiency  Calculus of gallbladder     Past Surgical  "History:    Laparoscopic cholecystectomy  colonoscopy x2  CT pelvic abscess drain      Family History:    Brother: heart disease  Father: asthma, diabetes  Mother: blood disease, diabetes  Sister: unspecified cancer, diabetes, asthma     Social History:   reports that he has never smoked. He does not have any smokeless tobacco history on file. He reports that he does not currently use alcohol. He reports that he does not currently use drugs.  PCP: Michele Sargent   Presents to the ED alone.     Physical Exam     Patient Vitals for the past 24 hrs:   BP Temp Temp src Pulse Resp SpO2 Height Weight   01/08/23 1405 111/57 -- -- 107 30 -- -- --   01/08/23 1403 (!) 81/40 -- -- 101 17 -- -- --   01/08/23 1401 109/65 -- -- 87 10 -- -- --   01/08/23 1200 113/70 -- -- 104 20 -- -- --   01/08/23 1143 (!) 63/41 -- -- 115 15 97 % -- --   01/08/23 1142 124/73 -- -- 92 13 97 % -- --   01/08/23 1100 122/76 -- -- 93 16 98 % -- --   01/08/23 1007 -- -- -- -- -- 95 % -- --   01/08/23 0939 135/65 99.3  F (37.4  C) Oral 110 18 99 % 1.88 m (6' 2\") 100.7 kg (222 lb)        Physical Exam  General: alert, lying comfortably on gurney  HENT: mucous membranes dry  CV: regular rate, regular rhythm  Resp: normal effort, clear throughout, no crackles or wheezing  GI: abdomen soft and nontender, no guarding  MSK: no bony tenderness  Skin: appropriately warm and dry  Extremities: no edema, calves non-tender  Neuro: alert, clear speech, oriented  Psych: normal mood and affect      Emergency Department Course   ECG:  ECG results from 01/08/23   EKG 12-lead, tracing only     Value    Systolic Blood Pressure     Diastolic Blood Pressure     Ventricular Rate 86    Atrial Rate 86    MS Interval 196    QRS Duration 58        QTc 406    P Axis     R AXIS 189    T Axis -88    Interpretation ECG      Suspect arm lead reversal, interpretation assumes no reversal  Sinus rhythm with Premature supraventricular complexes  Right ventricular " hypertrophy  Inferior infarct , age undetermined  Anterolateral infarct , age undetermined  Abnormal ECG  No previous ECGs available         Laboratory:  Labs Ordered and Resulted from Time of ED Arrival to Time of ED Departure   COMPREHENSIVE METABOLIC PANEL - Abnormal       Result Value    Sodium 136      Potassium 4.1      Chloride 101      Carbon Dioxide (CO2) 23      Anion Gap 12      Urea Nitrogen 17      Creatinine 1.60 (*)     Calcium 9.9      Glucose 107 (*)     Alkaline Phosphatase 77      AST 10      ALT 8      Protein Total 7.0      Albumin 3.3 (*)     Bilirubin Total 0.8      GFR Estimate 45 (*)    CBC WITH PLATELETS AND DIFFERENTIAL - Abnormal    WBC Count 7.6      RBC Count 4.38 (*)     Hemoglobin 13.6      Hematocrit 41.2      MCV 94      MCH 31.1      MCHC 33.0      RDW 16.5 (*)     Platelet Count 245      % Neutrophils 67      % Lymphocytes 20      % Monocytes 11      % Eosinophils 1      % Basophils 1      % Immature Granulocytes 0      NRBCs per 100 WBC 0      Absolute Neutrophils 5.1      Absolute Lymphocytes 1.5      Absolute Monocytes 0.8      Absolute Eosinophils 0.1      Absolute Basophils 0.1      Absolute Immature Granulocytes 0.0      Absolute NRBCs 0.0     ROUTINE UA WITH MICROSCOPIC REFLEX TO CULTURE   LACTIC ACID WHOLE BLOOD   PROCALCITONIN   CORTISOL   TROPONIN I       Emergency Department Course & Assessments:       Interventions:  Medications   0.9% sodium chloride BOLUS (0 mLs Intravenous Stopped 1/8/23 1211)   0.9% sodium chloride BOLUS (0 mLs Intravenous Stopped 1/8/23 1412)     Consultations/Discussion of Management or Tests:  1008 I obtained history and examined the patient as noted above.   1326 I rechecked the patient and explained findings.   1434 I spoke with Dr. Giraldo, Hospitalist, who agreed to accept the patient.        Disposition:  The patient was admitted to the hospital under the care of Dr. Giraldo.     Impression & Plan      Medical Decision Making:  Jan Hernandez is  a 77 year old male, with history of Crohn's disease, chronic heart failure, who presents with dizziness.  Symptoms and vitals are suggestive of orthostatic hypotension.  On exam, the patient appears volume depleted.  Labs are stable, without significant change in creatinine.  At this point, no evidence for hemorrhage, cardiogenic shock, anaphylaxis, or pulmonary embolism.  Patient's symptoms are orthostatic vitals have started to improve with fluids, but he remains hypotensive and lightlheaded with standing.  Will plan to admit for further evaluation, and continued fluid resuscitation.        Diagnosis:    ICD-10-CM    1. Orthostatic hypotension  I95.1          Scribe Disclosure:  IKeagan, am serving as a scribe at 9:53 AM on 1/8/2023 to document services personally performed by Vaishnavi Michael MD based on my observations and the provider's statements to me.     IKrista, am serving as a scribe on 1/8/2023 at 3:14 PM to personally document services performed by Vaishnavi Michael MD based on my observations and the provider's statements to me.       1/8/2023   Vaishnavi Michael MD Pepper, Tracy Lynn, MD  01/08/23 2050

## 2023-01-09 ENCOUNTER — APPOINTMENT (OUTPATIENT)
Dept: CARDIOLOGY | Facility: CLINIC | Age: 78
DRG: 312 | End: 2023-01-09
Attending: INTERNAL MEDICINE
Payer: COMMERCIAL

## 2023-01-09 LAB
ALBUMIN SERPL-MCNC: 2.7 G/DL (ref 3.4–5)
ANION GAP SERPL CALCULATED.3IONS-SCNC: 6 MMOL/L (ref 3–14)
ATRIAL RATE - MUSE: 86 BPM
BASOPHILS # BLD AUTO: 0.1 10E3/UL (ref 0–0.2)
BASOPHILS NFR BLD AUTO: 1 %
BUN SERPL-MCNC: 12 MG/DL (ref 7–30)
CALCIUM SERPL-MCNC: 8.9 MG/DL (ref 8.5–10.1)
CHLORIDE BLD-SCNC: 107 MMOL/L (ref 94–109)
CO2 SERPL-SCNC: 26 MMOL/L (ref 20–32)
CORTIS SERPL-MCNC: 13.6 UG/DL
CREAT SERPL-MCNC: 1.44 MG/DL (ref 0.66–1.25)
DIASTOLIC BLOOD PRESSURE - MUSE: NORMAL MMHG
EOSINOPHIL # BLD AUTO: 0.2 10E3/UL (ref 0–0.7)
EOSINOPHIL NFR BLD AUTO: 2 %
ERYTHROCYTE [DISTWIDTH] IN BLOOD BY AUTOMATED COUNT: 16.5 % (ref 10–15)
GFR SERPL CREATININE-BSD FRML MDRD: 51 ML/MIN/1.73M2
GLUCOSE BLD-MCNC: 124 MG/DL (ref 70–99)
GLUCOSE BLDC GLUCOMTR-MCNC: 112 MG/DL (ref 70–99)
GLUCOSE BLDC GLUCOMTR-MCNC: 117 MG/DL (ref 70–99)
GLUCOSE BLDC GLUCOMTR-MCNC: 118 MG/DL (ref 70–99)
GLUCOSE BLDC GLUCOMTR-MCNC: 124 MG/DL (ref 70–99)
GLUCOSE BLDC GLUCOMTR-MCNC: 165 MG/DL (ref 70–99)
HCT VFR BLD AUTO: 34.5 % (ref 40–53)
HGB BLD-MCNC: 11.3 G/DL (ref 13.3–17.7)
IMM GRANULOCYTES # BLD: 0 10E3/UL
IMM GRANULOCYTES NFR BLD: 0 %
INTERPRETATION ECG - MUSE: NORMAL
LVEF ECHO: NORMAL
LYMPHOCYTES # BLD AUTO: 1.2 10E3/UL (ref 0.8–5.3)
LYMPHOCYTES NFR BLD AUTO: 17 %
MAGNESIUM SERPL-MCNC: 2 MG/DL (ref 1.6–2.3)
MCH RBC QN AUTO: 31.4 PG (ref 26.5–33)
MCHC RBC AUTO-ENTMCNC: 32.8 G/DL (ref 31.5–36.5)
MCV RBC AUTO: 96 FL (ref 78–100)
MONOCYTES # BLD AUTO: 0.8 10E3/UL (ref 0–1.3)
MONOCYTES NFR BLD AUTO: 12 %
NEUTROPHILS # BLD AUTO: 4.6 10E3/UL (ref 1.6–8.3)
NEUTROPHILS NFR BLD AUTO: 68 %
NRBC # BLD AUTO: 0 10E3/UL
NRBC BLD AUTO-RTO: 0 /100
P AXIS - MUSE: NORMAL DEGREES
PHOSPHATE SERPL-MCNC: 2.3 MG/DL (ref 2.5–4.5)
PLATELET # BLD AUTO: 191 10E3/UL (ref 150–450)
POTASSIUM BLD-SCNC: 4.2 MMOL/L (ref 3.4–5.3)
PR INTERVAL - MUSE: 196 MS
QRS DURATION - MUSE: 58 MS
QT - MUSE: 340 MS
QTC - MUSE: 406 MS
R AXIS - MUSE: 189 DEGREES
RBC # BLD AUTO: 3.6 10E6/UL (ref 4.4–5.9)
SODIUM SERPL-SCNC: 139 MMOL/L (ref 133–144)
SYSTOLIC BLOOD PRESSURE - MUSE: NORMAL MMHG
T AXIS - MUSE: -88 DEGREES
TROPONIN I SERPL HS-MCNC: 16 NG/L
TROPONIN I SERPL HS-MCNC: 17 NG/L
VENTRICULAR RATE- MUSE: 86 BPM
WBC # BLD AUTO: 6.8 10E3/UL (ref 4–11)

## 2023-01-09 PROCEDURE — 84484 ASSAY OF TROPONIN QUANT: CPT | Performed by: INTERNAL MEDICINE

## 2023-01-09 PROCEDURE — 93306 TTE W/DOPPLER COMPLETE: CPT | Mod: 26 | Performed by: INTERNAL MEDICINE

## 2023-01-09 PROCEDURE — 120N000001 HC R&B MED SURG/OB

## 2023-01-09 PROCEDURE — 250N000013 HC RX MED GY IP 250 OP 250 PS 637: Performed by: INTERNAL MEDICINE

## 2023-01-09 PROCEDURE — 82533 TOTAL CORTISOL: CPT | Performed by: INTERNAL MEDICINE

## 2023-01-09 PROCEDURE — 93306 TTE W/DOPPLER COMPLETE: CPT

## 2023-01-09 PROCEDURE — 80069 RENAL FUNCTION PANEL: CPT | Performed by: INTERNAL MEDICINE

## 2023-01-09 PROCEDURE — 36415 COLL VENOUS BLD VENIPUNCTURE: CPT | Performed by: INTERNAL MEDICINE

## 2023-01-09 PROCEDURE — 83735 ASSAY OF MAGNESIUM: CPT | Performed by: INTERNAL MEDICINE

## 2023-01-09 PROCEDURE — 99222 1ST HOSP IP/OBS MODERATE 55: CPT | Mod: 25 | Performed by: INTERNAL MEDICINE

## 2023-01-09 PROCEDURE — 250N000011 HC RX IP 250 OP 636: Performed by: INTERNAL MEDICINE

## 2023-01-09 PROCEDURE — 85025 COMPLETE CBC W/AUTO DIFF WBC: CPT | Performed by: INTERNAL MEDICINE

## 2023-01-09 PROCEDURE — 99233 SBSQ HOSP IP/OBS HIGH 50: CPT | Performed by: INTERNAL MEDICINE

## 2023-01-09 RX ORDER — FLUDROCORTISONE ACETATE 0.1 MG/1
0.1 TABLET ORAL DAILY
Status: DISCONTINUED | OUTPATIENT
Start: 2023-01-09 | End: 2023-01-12 | Stop reason: HOSPADM

## 2023-01-09 RX ORDER — MIDODRINE HYDROCHLORIDE 2.5 MG/1
5 TABLET ORAL
Status: DISCONTINUED | OUTPATIENT
Start: 2023-01-09 | End: 2023-01-12 | Stop reason: HOSPADM

## 2023-01-09 RX ADMIN — SULFASALAZINE 1000 MG: 500 TABLET ORAL at 21:27

## 2023-01-09 RX ADMIN — FLUDROCORTISONE ACETATE 0.1 MG: 0.1 TABLET ORAL at 11:49

## 2023-01-09 RX ADMIN — Medication 1 CAPSULE: at 20:24

## 2023-01-09 RX ADMIN — ROSUVASTATIN CALCIUM 40 MG: 20 TABLET, FILM COATED ORAL at 20:24

## 2023-01-09 RX ADMIN — CEFTRIAXONE SODIUM 1 G: 1 INJECTION, POWDER, FOR SOLUTION INTRAMUSCULAR; INTRAVENOUS at 21:28

## 2023-01-09 RX ADMIN — CARBIDOPA AND LEVODOPA 2.5 MG: 50; 200 TABLET, EXTENDED RELEASE ORAL at 08:27

## 2023-01-09 RX ADMIN — BUPROPION HYDROCHLORIDE 300 MG: 150 TABLET, FILM COATED, EXTENDED RELEASE ORAL at 08:27

## 2023-01-09 RX ADMIN — PANTOPRAZOLE SODIUM 40 MG: 40 TABLET, DELAYED RELEASE ORAL at 06:51

## 2023-01-09 RX ADMIN — ASPIRIN 81 MG: 81 TABLET, COATED ORAL at 08:27

## 2023-01-09 RX ADMIN — CARBIDOPA AND LEVODOPA 5 MG: 50; 200 TABLET, EXTENDED RELEASE ORAL at 17:30

## 2023-01-09 RX ADMIN — CARBIDOPA AND LEVODOPA 5 MG: 50; 200 TABLET, EXTENDED RELEASE ORAL at 11:49

## 2023-01-09 RX ADMIN — SULFASALAZINE 1000 MG: 500 TABLET ORAL at 08:27

## 2023-01-09 RX ADMIN — Medication 1 CAPSULE: at 08:28

## 2023-01-09 RX ADMIN — ESCITALOPRAM OXALATE 10 MG: 10 TABLET ORAL at 20:24

## 2023-01-09 RX ADMIN — SULFASALAZINE 1000 MG: 500 TABLET ORAL at 16:37

## 2023-01-09 ASSESSMENT — ACTIVITIES OF DAILY LIVING (ADL)
ADLS_ACUITY_SCORE: 20
ADLS_ACUITY_SCORE: 20
DEPENDENT_IADLS:: INDEPENDENT
ADLS_ACUITY_SCORE: 20

## 2023-01-09 NOTE — PROGRESS NOTES
[facility]    Hospitalist Progress Note    Brief Summary:   This is a 77-year-old male with history of diabetes mellitus type 2, depression, diastolic congestive heart failure, chronic kidney disease stage III, Crohn's disease, hypertension, dextrocardia came to the ER with complaint of dizziness and lightheadedness.    Assessment & Plan     1.  Significant orthostatic hypotension:  This is a 77-year-old male with history of diabetes for a long time, uncontrolled with A1c of 8.1.  Does have peripheral neuropathy. The causes of orthostatic hypotension is multifactorial, related to  volume depletion, continued taking his amlodipine at home and likely some autonomic neuropathy given his diabetes. Stop both metoprolol and amlodipine now, compression stocking thigh high in place and started on low dose Midodrine 2.5 mg TID.     Although improve symptomatically but still have significant orthostatic Hypotension this morning. I will increase his Midodrine to 5 mg TID and add Florinef 0.1 mg daily, stop IV fluids now, continue to check Orthostatic blood pressure.   Echo pending at this time, US carotid shows some stenosis but not significant, serial troponin remain negative.        2.  Diabetes mellitus type 2:  He is on glimepiride.  We will hold the glimepiride.  Keep him on sliding scale insulin for correction and hypoglycemia protocol. BS reasonably control.     3.  Chronic kidney disease stage III:  Baseline creatinine 1.4-1.5, slightly high, 1.6 on admission, improve to his baseline with IV fluids, , mostly because of orthostatic hypotension.  stop IV fluids now.     4.  History of diastolic congestive heart failure:  Does not look like in any acute exacerbation at this time.  We do not have any echocardiogram in our records or a recent echo in the Care Everywhere, we will do the echocardiogram as a workup for his orthostatic as well. Echo pending.     5.  Hyperlipidemia, on Crestor:  We will  continue with that.    6.  History of Crohn's disease:  He has been much better controlled at this time.  He was recently on prednisone.  Not on any prednisone anymore.  Continue with sulfasalazine.  Random cortisol level normal yesterday, morning cortisol for this AM is pending.     7.  Hypertension:  Apparently continued to take amlodipine, despite significant orthostatic hypotension, we will discontinue that.  started him on Midodrine .  There is risk of supine hypertension.  So recommended to have his head of the bed, always elevated to 30-40 degrees most of the time, even when sleeping.    8.  Possible UTI: UA abnormal, urine culture pending, started on IV Ceftriaxone at this time.      DVT Prophylaxis: Pneumatic Compression Devices  Code Status: Full Code     Disposition: Expected discharge in 1-2 days once stable         Joe Giraldo MD, MD  Text Page  (7am - 6pm)    Interval History   Patient seen and evaluated this morning, feeling overall better, has some lightheadedness when stand up but his blood pressure did decrease significantly again to 60's on standing. No chest pain, SOB, fever, chills, abdominal pain, headache or dizziness.     -Data reviewed today: I reviewed all new labs and imaging results over the last 24 hours. I personally reviewed no images or EKG's today.    Physical Exam   Temp: 98.4  F (36.9  C) Temp src: Oral BP: 125/59 (Sitting for Orthostatic BP) Pulse: 78   Resp: 18 SpO2: 97 % O2 Device: None (Room air)    Vitals:    01/08/23 0939 01/09/23 0141   Weight: 100.7 kg (222 lb) 102 kg (224 lb 14.4 oz)     Vital Signs with Ranges  Temp:  [98.1  F (36.7  C)-99  F (37.2  C)] 98.4  F (36.9  C)  Pulse:  [] 78  Resp:  [10-30] 18  BP: ()/(40-94) 125/59  SpO2:  [95 %-98 %] 97 %  I/O last 3 completed shifts:  In: 1462 [P.O.:360; I.V.:1102]  Out: 1350 [Urine:1350]    Constitutional: awake, alert, cooperative, no apparent distress, and appears stated age  Eyes: Lids and lashes normal,  pupils equal, round and reactive to light, extra ocular muscles intact, sclera clear, conjunctiva normal  Respiratory: No increased work of breathing, good air exchange, clear to auscultation bilaterally, no crackles or wheezing  Cardiovascular: Normal apical impulse, regular rate and rhythm, normal S1 and S2, no S3 or S4, and no murmur noted  GI: No scars, normal bowel sounds, soft, non-distended, non-tender, no masses palpated, no hepatosplenomegally  Musculoskeletal: no lower extremity pitting edema present  Neurologic: no focal deficit.     Medications       aspirin  81 mg Oral Daily     buPROPion  300 mg Oral Daily     cefTRIAXone  1 g Intravenous Q24H     escitalopram  10 mg Oral Daily     fludrocortisone  0.1 mg Oral Daily     insulin aspart  1-7 Units Subcutaneous TID AC     insulin aspart  1-5 Units Subcutaneous At Bedtime     midodrine  5 mg Oral TID w/meals     multivitamin  with lutein  1 capsule Oral BID     pantoprazole  40 mg Oral QAM AC     rosuvastatin  40 mg Oral Daily     sodium chloride (PF)  3 mL Intracatheter Q8H     sulfaSALAzine  1,000 mg Oral TID       Data   Recent Labs   Lab 01/09/23  0722 01/09/23  0637 01/09/23  0159 01/08/23  2126 01/08/23  1009   WBC  --  6.8  --   --  7.6   HGB  --  11.3*  --   --  13.6   MCV  --  96  --   --  94   PLT  --  191  --   --  245   NA  --  139  --   --  136   POTASSIUM  --  4.2  --   --  4.1   CHLORIDE  --  107  --   --  101   CO2  --  26  --   --  23   BUN  --  12  --   --  17   CR  --  1.44*  --   --  1.60*   ANIONGAP  --  6  --   --  12   LORENZO  --  8.9  --   --  9.9   * 124* 112*   < > 107*   ALBUMIN  --  2.7*  --   --  3.3*   PROTTOTAL  --   --   --   --  7.0   BILITOTAL  --   --   --   --  0.8   ALKPHOS  --   --   --   --  77   ALT  --   --   --   --  8   AST  --   --   --   --  10    < > = values in this interval not displayed.       Recent Results (from the past 24 hour(s))   US Carotid Bilateral    Narrative    EXAM: US CAROTID  BILATERAL  LOCATION: [facility]  DATE/TIME: 1/8/2023 8:50 PM    INDICATION: orthostatic hypotension, fall  COMPARISON: None.  TECHNIQUE: Duplex exam performed utilizing 2D gray-scale imaging, Doppler interrogation with color-flow and spectral waveform analysis. The percent diameter stenosis is determined using NASCET criteria and Society of Radiologists in Ultrasound Consensus   Criteria.    FINDINGS:    RIGHT: Moderate plaque at the bifurcation. The peak systolic velocity in the ICA is 125-230 cm/sec, consistent with 50-69% stenosis. Normal velocities in the ECA. Antegrade flow within the vertebral artery.     LEFT: Mild plaque at the bifurcation. The peak systolic velocity in the ICA is less than 125 cm/sec, consistent with less than 50% stenosis. Normal velocities in the ECA. Antegrade flow within the vertebral artery.    VELOCITY CHART:  CCA   Right: 127 cm/s   Left: 103 cm/s  ICA   Right: 132 cm/s   Left: 88 cm/s  ECA   Right: 136 cm/s   Left: 151 cm/s  ICA/CCA PSV Ratio   Right: 1.0   Left: 0.9      Impression    IMPRESSION:  1.  Moderate plaque formation, velocities consistent with 50-69% stenosis in the right internal carotid artery.  2.  Mild plaque formation, velocities consistent with less than 50% stenosis in the left internal carotid artery.  3.  Flow within the vertebral arteries is antegrade.

## 2023-01-09 NOTE — PLAN OF CARE
Goal Outcome Evaluation:  A&Ox4. VSS on RA. Tele SR w/ occasional PVCs. Denied any dizziness/lightheadedness. Neuros intact. Compression stockings on. Denied pain. Tolerating mod carb diet.  and 112. PIV infusing NS at 100 ml/hr. Voiding well, urine slightly cloudy. UC pending. Started on intermittent Rocephin. Up SBA w/ cane. Cardiology consulted. Plan for echo. PT, OT and SW also consulted.       Plan of Care Reviewed With: patient    Overall Patient Progress: improving

## 2023-01-09 NOTE — PROVIDER NOTIFICATION
MD Notification    Notified Person: MD    Notified Person Name: Dr. Ramirez    Notification Date/Time: 2100 1/8/2023    Notification Interaction: Jayy     Purpose of Notification: US Carotid and UA results back.    Orders Received: Started on Rocephin     Comments:

## 2023-01-09 NOTE — CONSULTS
Care Management Initial Consult    General Information  Assessment completed with: Patient,    Type of CM/SW Visit: Initial Assessment    Primary Care Provider verified and updated as needed: Yes   Readmission within the last 30 days: no previous admission in last 30 days      Reason for Consult: discharge planning  Advance Care Planning:            Communication Assessment  Patient's communication style: spoken language (English or Bilingual)    Hearing Difficulty or Deaf: no   Wear Glasses or Blind: no    Cognitive  Cognitive/Neuro/Behavioral: WDL  Level of Consciousness: alert  Arousal Level: opens eyes spontaneously  Orientation: oriented x 4  Mood/Behavior: calm, cooperative  Best Language: 0 - No aphasia  Speech: clear, logical    Living Environment:   People in home: alone     Current living Arrangements: apartment (3 steps down to enter)      Able to return to prior arrangements: yes       Family/Social Support:  Care provided by: self  Provides care for: no one  Marital Status: Single  Children          Description of Support System: Supportive, Involved         Current Resources:   Patient receiving home care services: No     Community Resources: None  Equipment currently used at home: cane, straight  Supplies currently used at home:      Employment/Financial:  Employment Status: employed full-time        Financial Concerns:             Lifestyle & Psychosocial Needs:  Social Determinants of Health     Tobacco Use: Medium Risk     Smoking Tobacco Use: Former     Smokeless Tobacco Use: Never     Passive Exposure: Not on file   Alcohol Use: Not on file   Financial Resource Strain: Not on file   Food Insecurity: Not on file   Transportation Needs: Not on file   Physical Activity: Not on file   Stress: Not on file   Social Connections: Not on file   Intimate Partner Violence: Not on file   Depression: Not on file   Housing Stability: Not on file       Functional Status:  Prior to admission patient needed  assistance:   Dependent ADLs:: Independent, Ambulation-cane  Dependent IADLs:: Independent       Mental Health Status:  Mental Health Status: No Current Concerns       Chemical Dependency Status:  Chemical Dependency Status: No Current Concerns             Values/Beliefs:  Spiritual, Cultural Beliefs, Faith Practices, Values that affect care: no               Additional Information:  Initial consult completed with patient. Lives independently in own apartment. Case management will follow along. No needs anticipated at this time.     Katerina Houser RN   [facility]   Phone [phone]

## 2023-01-09 NOTE — PROGRESS NOTES
RECEIVING UNIT ED HANDOFF REVIEW    ED Nurse Handoff Report was reviewed by: Micheline Salgado RN on January 8, 2023 at 6:26 PM

## 2023-01-10 ENCOUNTER — APPOINTMENT (OUTPATIENT)
Dept: PHYSICAL THERAPY | Facility: CLINIC | Age: 78
DRG: 312 | End: 2023-01-10
Attending: INTERNAL MEDICINE
Payer: COMMERCIAL

## 2023-01-10 LAB
BACTERIA UR CULT: NO GROWTH
GLUCOSE BLDC GLUCOMTR-MCNC: 126 MG/DL (ref 70–99)
GLUCOSE BLDC GLUCOMTR-MCNC: 131 MG/DL (ref 70–99)
GLUCOSE BLDC GLUCOMTR-MCNC: 136 MG/DL (ref 70–99)
GLUCOSE BLDC GLUCOMTR-MCNC: 139 MG/DL (ref 70–99)
GLUCOSE BLDC GLUCOMTR-MCNC: 170 MG/DL (ref 70–99)
MAGNESIUM SERPL-MCNC: 1.9 MG/DL (ref 1.6–2.3)
POTASSIUM BLD-SCNC: 3.9 MMOL/L (ref 3.4–5.3)

## 2023-01-10 PROCEDURE — 99232 SBSQ HOSP IP/OBS MODERATE 35: CPT | Performed by: INTERNAL MEDICINE

## 2023-01-10 PROCEDURE — 250N000011 HC RX IP 250 OP 636: Performed by: INTERNAL MEDICINE

## 2023-01-10 PROCEDURE — 250N000013 HC RX MED GY IP 250 OP 250 PS 637: Performed by: INTERNAL MEDICINE

## 2023-01-10 PROCEDURE — 83735 ASSAY OF MAGNESIUM: CPT | Performed by: INTERNAL MEDICINE

## 2023-01-10 PROCEDURE — 97530 THERAPEUTIC ACTIVITIES: CPT | Mod: GP

## 2023-01-10 PROCEDURE — 97161 PT EVAL LOW COMPLEX 20 MIN: CPT | Mod: GP

## 2023-01-10 PROCEDURE — 999N000111 HC STATISTIC OT IP EVAL DEFER: Performed by: OCCUPATIONAL THERAPIST

## 2023-01-10 PROCEDURE — 36415 COLL VENOUS BLD VENIPUNCTURE: CPT | Performed by: INTERNAL MEDICINE

## 2023-01-10 PROCEDURE — 99232 SBSQ HOSP IP/OBS MODERATE 35: CPT | Mod: 25 | Performed by: INTERNAL MEDICINE

## 2023-01-10 PROCEDURE — 120N000001 HC R&B MED SURG/OB

## 2023-01-10 PROCEDURE — 84132 ASSAY OF SERUM POTASSIUM: CPT | Performed by: INTERNAL MEDICINE

## 2023-01-10 RX ADMIN — SENNOSIDES AND DOCUSATE SODIUM 2 TABLET: 50; 8.6 TABLET ORAL at 09:12

## 2023-01-10 RX ADMIN — CARBIDOPA AND LEVODOPA 5 MG: 50; 200 TABLET, EXTENDED RELEASE ORAL at 17:19

## 2023-01-10 RX ADMIN — BUPROPION HYDROCHLORIDE 300 MG: 150 TABLET, FILM COATED, EXTENDED RELEASE ORAL at 08:43

## 2023-01-10 RX ADMIN — CARBIDOPA AND LEVODOPA 5 MG: 50; 200 TABLET, EXTENDED RELEASE ORAL at 08:36

## 2023-01-10 RX ADMIN — ROSUVASTATIN CALCIUM 40 MG: 20 TABLET, FILM COATED ORAL at 19:37

## 2023-01-10 RX ADMIN — ASPIRIN 81 MG: 81 TABLET, COATED ORAL at 08:35

## 2023-01-10 RX ADMIN — SULFASALAZINE 1000 MG: 500 TABLET ORAL at 08:35

## 2023-01-10 RX ADMIN — CEFTRIAXONE SODIUM 1 G: 1 INJECTION, POWDER, FOR SOLUTION INTRAMUSCULAR; INTRAVENOUS at 21:13

## 2023-01-10 RX ADMIN — ESCITALOPRAM OXALATE 10 MG: 10 TABLET ORAL at 19:37

## 2023-01-10 RX ADMIN — Medication 1 CAPSULE: at 08:36

## 2023-01-10 RX ADMIN — FLUDROCORTISONE ACETATE 0.1 MG: 0.1 TABLET ORAL at 08:36

## 2023-01-10 RX ADMIN — Medication 1 CAPSULE: at 21:12

## 2023-01-10 RX ADMIN — SULFASALAZINE 1000 MG: 500 TABLET ORAL at 17:18

## 2023-01-10 RX ADMIN — CARBIDOPA AND LEVODOPA 5 MG: 50; 200 TABLET, EXTENDED RELEASE ORAL at 12:31

## 2023-01-10 RX ADMIN — SENNOSIDES AND DOCUSATE SODIUM 2 TABLET: 50; 8.6 TABLET ORAL at 19:37

## 2023-01-10 RX ADMIN — PANTOPRAZOLE SODIUM 40 MG: 40 TABLET, DELAYED RELEASE ORAL at 06:33

## 2023-01-10 RX ADMIN — SULFASALAZINE 1000 MG: 500 TABLET ORAL at 21:12

## 2023-01-10 ASSESSMENT — ACTIVITIES OF DAILY LIVING (ADL)
ADLS_ACUITY_SCORE: 20
ADLS_ACUITY_SCORE: 27
ADLS_ACUITY_SCORE: 21
ADLS_ACUITY_SCORE: 27
ADLS_ACUITY_SCORE: 27
ADLS_ACUITY_SCORE: 20
ADLS_ACUITY_SCORE: 27
ADLS_ACUITY_SCORE: 20

## 2023-01-10 NOTE — PLAN OF CARE
OT: Orders received, chart reviewed and discussed w/ care team. IP therapy needs being met by PT. Pt near baseline for ADL performance. No skilled OT indicated at this time. Will complete orders.

## 2023-01-10 NOTE — PLAN OF CARE
[id number]  Pt A&Ox4. VSS on Ra, positive orthostatic hypotension. Up A+1. Denies pain. Neuros intact. Compression stockings in place. Tolerating Mod CHO diet. PIV SL with intermittent abx. Using urinal at bedside with adequate output. Cards consulted. Echo completed today with EFLV of 55-60%. PT/OT/SW consulted as well.

## 2023-01-10 NOTE — PROGRESS NOTES
01/10/23 1001   Appointment Info   Signing Clinician's Name / Credentials (PT) Swapna Stoner, PT, DPT   Living Environment   People in Home alone   Current Living Arrangements apartment   Home Accessibility stairs to enter home   Number of Stairs, Main Entrance 3   Stair Railings, Main Entrance railings on both sides of stairs   Transportation Anticipated family or friend will provide   Living Environment Comments All needs on main level.   Self-Care   Usual Activity Tolerance good   Current Activity Tolerance moderate   Equipment Currently Used at Home cane, straight   Fall history within last six months yes   Number of times patient has fallen within last six months 1   Activity/Exercise/Self-Care Comment Pt uses of cane, otherwise ind with ADLs and IADLs. Pt was just discharged from TCU for about 5 days before being admitted to hospital. Pt's daughter is able to help with groceries and as needed, she is an ICU nurse.   General Information   Onset of Illness/Injury or Date of Surgery 01/08/22   Referring Physician Joe Giraldo MD   Patient/Family Therapy Goals Statement (PT) Planning on going home when able.   Pertinent History of Current Problem (include personal factors and/or comorbidities that impact the POC) Pt is a 77-year-old male with history of diabetes mellitus type 2, depression, diastolic congestive heart failure, chronic kidney disease stage III, Crohn's disease, hypertension, dextrocardia came to the ER with complaint of dizziness and lightheadedness.   Existing Precautions/Restrictions fall;cardiac   General Observations Activity:Up with Assist   Pain Assessment   Patient Currently in Pain No   Integumentary/Edema   Integumentary/Edema no deficits were identifed   Posture    Posture Kyphosis;Protracted shoulders;Forward head position   Range of Motion (ROM)   ROM Comment Grossly WFL BUE and LE   Strength (Manual Muscle Testing)   Strength (Manual Muscle Testing) Able to perform R SLR;Able  to perform L SLR   Strength Comments Grossly antigravity strength BUE and LE with functional transfers.   Bed Mobility   Comment, (Bed Mobility) Supine HOB approx 40 deg>sitting EOB, SBA, use of bed rail.   Transfers   Comment, (Transfers) Sit>stand to SPC, CGA.   Gait/Stairs (Locomotion)   Distance in Feet 5' eval + 60' treatment   Comment, (Gait/Stairs) Amb with SPC, CGA, no LOB, decreased ryann, decreased step length.   Sensory Examination   Sensory Perception Comments Pt reports baseline peripheral neuropathy in toes.   Clinical Impression   Criteria for Skilled Therapeutic Intervention Yes, treatment indicated   PT Diagnosis (PT) Impaired mobility   Influenced by the following impairments decreased activity tolerance   Functional limitations due to impairments fall risk, impaired functional independence.   Clinical Presentation (PT Evaluation Complexity) Evolving/Changing   Clinical Presentation Rationale per MR and clinical judgement   Clinical Decision Making (Complexity) low complexity   Planned Therapy Interventions (PT) balance training;bed mobility training;gait training;home exercise program;stair training;ROM (range of motion);strengthening;stretching;patient/family education;transfer training;progressive activity/exercise   Risk & Benefits of therapy have been explained evaluation/treatment results reviewed;care plan/treatment goals reviewed;risks/benefits reviewed;current/potential barriers reviewed;participants voiced agreement with care plan;participants included;patient   PT Total Evaluation Time   PT Eval, Low Complexity Minutes ([id number]) 4   Physical Therapy Goals   PT Frequency Daily   PT Predicted Duration/Target Date for Goal Attainment 01/15/23   PT Goals Bed Mobility;Transfers;Gait;Stairs   PT: Bed Mobility Independent;Supine to/from sit;Rolling   PT: Transfers Modified independent;Sit to/from stand;Bed to/from chair;Assistive device   PT: Gait Modified independent;Assistive device;Straight  cane;Greater than 200 feet   PT: Stairs Supervision/stand-by assist;Assistive device;3 stairs;Rail on both sides   PT Discharge Planning   PT Plan monitor vitals, stairs, gait in hallway.   PT Discharge Recommendation (DC Rec) home with assist   PT Rationale for DC Rec Pt tolerated session well. Anticipate with medical management and blood pressure stability, pt may discharge home with assist as needed for groceries, laundry, cooking, cleaning. Pt moving with SBA for safety at this time. Recommend supervision on stairs and continue with HEP.   PT Brief overview of current status bed mob, SBA. STS to SPC, SBA. Amb with SPC, SBA.   Total Session Time   Total Session Time (sum of timed and untimed services) 4

## 2023-01-10 NOTE — PROGRESS NOTES
[facility]    Hospitalist Progress Note    Brief Summary:   This is a 77-year-old male with history of diabetes mellitus type 2, depression, diastolic congestive heart failure, chronic kidney disease stage III, Crohn's disease, hypertension, dextrocardia came to the ER with complaint of dizziness and lightheadedness.    Assessment & Plan     1.  Significant orthostatic hypotension: now improved.      This is a 77-year-old male with history of diabetes for a long time, uncontrolled with A1c of 8.1.  Does have peripheral neuropathy. The causes of orthostatic hypotension is multifactorial, related to  volume depletion, continued taking his amlodipine at home and likely some autonomic neuropathy given his diabetes. Stop both metoprolol and amlodipine now, compression stocking thigh high in place and started on low dose Midodrine 2.5 mg TID.     Although improve symptomatically but still have significant orthostatic Hypotension till 1/9/23, I did increase his Midodrine to 5 mg TID and add Florinef 0.1 mg daily, stop IV fluids now, continue to check Orthostatic blood pressure. Cortisol morning and random normal.   Echo unremarkable with normal EF.  US carotid shows some stenosis but not significant, serial troponin remain negative.   At this time with Midodrine 5 mg TID and Florinef and lower extremity compression stocking his Orthostatic hypotension significantly improve, able to walk with the therapy today. If remain stable will be discharge home tomorrow.        2.  Diabetes mellitus type 2:  He is on glimepiride.  We will hold the glimepiride.  Keep him on sliding scale insulin for correction and hypoglycemia protocol. BS reasonably control.     3.  Chronic kidney disease stage III:  Baseline creatinine 1.4-1.5, slightly high, 1.6 on admission, improve to his baseline with IV fluids, , mostly because of orthostatic hypotension.  stop IV fluids now.     4.  History of diastolic congestive  heart failure:  Does not look like in any acute exacerbation at this time.  We do not have any echocardiogram in our records or a recent echo in the Care Everywhere, we will do the echocardiogram as a workup for his orthostatic as well. Echo pending.     5.  Hyperlipidemia, on Crestor:  We will continue with that.    6.  History of Crohn's disease:  He has been much better controlled at this time.  He was recently on prednisone.  Not on any prednisone anymore.  Continue with sulfasalazine.  Random cortisol level normal yesterday, morning cortisol for this AM is pending.     7.  Hypertension:  Apparently continued to take amlodipine, despite significant orthostatic hypotension, we will discontinue that.  started him on Midodrine .  There is risk of supine hypertension.  So recommended to have his head of the bed, always elevated to 30-40 degrees most of the time, even when sleeping.    8.  Possible UTI: UA abnormal, urine culture remain negative, started on IV Ceftriaxone will do 3 doses as culture negative.       DVT Prophylaxis: Pneumatic Compression Devices  Code Status: Full Code     Disposition: Expected discharge tomorrow          Joe Giraldo MD, MD  Text Page  (7am - 6pm)    Interval History   Patient doing well and sitting in chair today, earlier walk the the therapy and no significant drop in his blood pressure this morning.     No other significant event overnight.     -Data reviewed today: I reviewed all new labs and imaging results over the last 24 hours. I personally reviewed no images or EKG's today.    Physical Exam   Temp: 98.2  F (36.8  C) Temp src: Oral BP: 120/77 Pulse: 112   Resp: 18 SpO2: 97 % O2 Device: None (Room air)    Vitals:    01/08/23 0939 01/09/23 0141 01/10/23 0429   Weight: 100.7 kg (222 lb) 102 kg (224 lb 14.4 oz) 102.3 kg (225 lb 8 oz)     Vital Signs with Ranges  Temp:  [98.2  F (36.8  C)-98.4  F (36.9  C)] 98.2  F (36.8  C)  Pulse:  [] 112  Resp:  [17-20] 18  BP:  ()/(53-90) 120/77  SpO2:  [96 %-97 %] 97 %  I/O last 3 completed shifts:  In: -   Out: 850 [Urine:850]    Constitutional: awake, alert, cooperative, no apparent distress, and appears stated age  Eyes: Lids and lashes normal, pupils equal, round and reactive to light, extra ocular muscles intact, sclera clear, conjunctiva normal  Respiratory: No increased work of breathing, good air exchange, clear to auscultation bilaterally, no crackles or wheezing  Cardiovascular: Normal apical impulse, regular rate and rhythm, normal S1 and S2, no S3 or S4, and no murmur noted  GI: No scars, normal bowel sounds, soft, non-distended, non-tender, no masses palpated, no hepatosplenomegally  Musculoskeletal: no lower extremity pitting edema present  Neurologic: no focal deficit.     Medications       aspirin  81 mg Oral Daily     buPROPion  300 mg Oral Daily     cefTRIAXone  1 g Intravenous Q24H     escitalopram  10 mg Oral Daily     fludrocortisone  0.1 mg Oral Daily     insulin aspart  1-7 Units Subcutaneous TID AC     insulin aspart  1-5 Units Subcutaneous At Bedtime     midodrine  5 mg Oral TID w/meals     multivitamin  with lutein  1 capsule Oral BID     pantoprazole  40 mg Oral QAM AC     rosuvastatin  40 mg Oral Daily     sodium chloride (PF)  3 mL Intracatheter Q8H     sulfaSALAzine  1,000 mg Oral TID       Data   Recent Labs   Lab 01/10/23  1200 01/10/23  0823 01/10/23  0745 01/10/23  0146 01/09/23  0722 01/09/23  0637 01/08/23  2126 01/08/23  1009   WBC  --   --   --   --   --  6.8  --  7.6   HGB  --   --   --   --   --  11.3*  --  13.6   MCV  --   --   --   --   --  96  --  94   PLT  --   --   --   --   --  191  --  245   NA  --   --   --   --   --  139  --  136   POTASSIUM  --  3.9  --   --   --  4.2  --  4.1   CHLORIDE  --   --   --   --   --  107  --  101   CO2  --   --   --   --   --  26 --  23   BUN  --   --   --   --   --  12  --  17   CR  --   --   --   --   --  1.44*  --  1.60*   ANIONGAP  --   --   --   --    --  6  --  12   LORENZO  --   --   --   --   --  8.9  --  9.9   *  --  139* 126*   < > 124*   < > 107*   ALBUMIN  --   --   --   --   --  2.7*  --  3.3*   PROTTOTAL  --   --   --   --   --   --   --  7.0   BILITOTAL  --   --   --   --   --   --   --  0.8   ALKPHOS  --   --   --   --   --   --   --  77   ALT  --   --   --   --   --   --   --  8   AST  --   --   --   --   --   --   --  10    < > = values in this interval not displayed.       Recent Results (from the past 24 hour(s))   Echocardiogram Complete   Result Value    LVEF  55-60%    Narrative    [id number]  [id number]  [id number]  [id number]^JOVANI^PANCHITO^GAVIN     [facility]  Echocardiography Laboratory  [address] [place] [id number]     Name: YAA BYRD  MRN: [id number]  : 1945  Study Date: 2023 01:43 PM  Age: 77 yrs  Gender: Male  Patient Location: [facility]  Reason For Study: Other Diseases of Pericardium  Ordering Physician: PANCHITO HAHN  Referring Physician: Michele Sargent  Performed By: [place]     BSA: 2.3 m2  Height: 74 in  Weight: 222 lb  HR: 94  BP: 125/65 mmHg  ______________________________________________________________________________  Procedure  Complete Portable Echo Adult.  ______________________________________________________________________________  Interpretation Summary     Pt has situs inversus, very challenging imaging. Contrast not administered.  1. Normal biventricular size and function. Left ventricular ejection fraction  of 55-60%.  2. No hemodynamically significant valvular disease.  No prior study for comparison.  ______________________________________________________________________________  Left Ventricle  The left ventricle is normal in size. There is concentric remodeling present.  Left ventricular systolic function is normal. The visual ejection fraction is  55-60%. Grade I or early diastolic dysfunction. Regional wall motion  abnormalities cannot be excluded due to limited  visualization.     Right Ventricle  The right ventricle is normal in size and function.     Atria  Normal left atrial size. Right atrial size is normal.     Mitral Valve  The mitral valve leaflets appear normal. There is no evidence of stenosis,  fluttering, or prolapse. There is trace mitral regurgitation.     Tricuspid Valve  Normal tricuspid valve. There is trace tricuspid regurgitation. Right  ventricular systolic pressure could not be approximated due to inadequate  tricuspid regurgitation.     Aortic Valve  There is mild trileaflet aortic sclerosis. No aortic stenosis is present.     Pulmonic Valve  The pulmonic valve is not well visualized.     Vessels  Normal size aorta. Normal size ascending aorta. Inferior vena cava not well  visualized for estimation of right atrial pressure.     Pericardium  There is no pericardial effusion.     Rhythm  Sinus rhythm was noted.  ______________________________________________________________________________  MMode/2D Measurements & Calculations  IVSd: 1.2 cm     LVIDd: 4.3 cm  LVIDs: 2.9 cm  LVPWd: 1.0 cm  FS: 33.6 %  LV mass(C)d: 162.3 grams  LV mass(C)dI: 71.4 grams/m2  Ao root diam: 3.3 cm  LA dimension: 3.9 cm  asc Aorta Diam: 3.3 cm  LA/Ao: 1.2  LVOT diam: 2.1 cm  LVOT area: 3.4 cm2  LA Volume (BP): 42.3 ml  LA Volume Index (BP): 18.6 ml/m2  RWT: 0.47     Doppler Measurements & Calculations  MV E max francia: 69.4 cm/sec  MV A max francia: 117.4 cm/sec  MV E/A: 0.59  MV dec slope: 492.6 cm/sec2  MV dec time: 0.14 sec  PA acc time: 0.08 sec  E/E' avg: 10.4  Lateral E/e': 7.4  Medial E/e': 13.3     ______________________________________________________________________________  Report approved by: James Moon 01/09/2023 03:48 PM

## 2023-01-10 NOTE — PLAN OF CARE
Goal Outcome Evaluation: A&Ox4. VSS on Ra, positive orthostatic hypotension. Up A+1. Denies pain. Neuros & CMS intact, baseline neuropathy to BLE. Compression stockings in place.  PIV SL. Using urinal at bedside with adequate output, dark yellow, pushing fluids. TELE NS with occasional PVC's. Continue to monitor.       Plan of Care Reviewed With: patient    Overall Patient Progress: improvingOverall Patient Progress: improving

## 2023-01-10 NOTE — PROGRESS NOTES
[facility]    Cardiology Consultation       Assessment & Plan     1.  Orthostatic hypotension, likely multifactorial in origin, improving  2.  Crohn's disease  3.  Normal myocardial perfusion study outside hospital 2021, preserved LV systolic function  4.  Diabetes mellitus  5.  Chronic kidney disease  6.  Hyperlipidemia  7.  Hypertension, medications on hold at present.  8.  Dextrocardia    Recommendations    1.  Orthostatic hypotension: Improving.  Continue to hold his blood pressure medications.  Continue with midodrine and florinef.  Echocardiogram was within normal limits as noted below.    2.  Also infectious work-up is also in progress given his presentation.    3.  He is an established patient of the [facility] system and cannot follow-up with the [facility] cardiology team upon discharge.    4.  Cardiology will sign off        Amanuel Webb MD, MD        HPI:    Reviewed clinical course overnight.  Patient continues to improve, echocardiogram performed as noted below    Echo     Pt has situs inversus, very challenging imaging. Contrast not administered.  1. Normal biventricular size and function. Left ventricular ejection fraction  of 55-60%.  2. No hemodynamically significant valvular disease.  No prior study for comparison.      Primary Care Physician   Michele Sargent      Patient Active Problem List   Diagnosis     Orthostatic hypotension     Anemia due to stage 3 chronic kidney disease (H)     Chronic diastolic heart failure (H)     Class 2 severe obesity due to excess calories with serious comorbidity in adult (H)     Depression, recurrent (H)     Dextrocardia     Myelopathy (H)     Obstructive sleep apnea on CPAP     Peripheral vascular disease (H)     Primary osteoarthritis of both knees     Regional enteritis (H)     Type 2 diabetes mellitus, controlled (H)     Vitamin D deficiency     Crohn's disease (H)       Past Medical History   I have reviewed this patient's  medical history and updated it with pertinent information if needed.   Past Medical History:   Diagnosis Date     Anemia due to stage 3 chronic kidney disease (H) 3/18/2019     Chronic diastolic heart failure (H) 6/11/2020     Crohn's disease (H) 1/8/2023     Dextrocardia 1/8/2023    Formatting of this note might be different from the original. FEb 2019.  See echo from [facility].  Very difficult exam but LV function est 60%     Diabetes (H)      Hyperlipidemia with target low density lipoprotein (LDL) cholesterol less than 70 mg/dL 1/8/2023    Formatting of this note might be different from the original. Myalgias with atorvastatin.     Myelopathy (H) 3/29/2022     Peripheral vascular disease (H) 3/29/2022    Formatting of this note might be different from the original. Does not check at home.     Type 2 diabetes mellitus, controlled (H) 6/8/2006    Formatting of this note might be different from the original. LW Onset:  08Jun06 ; DM Type2       Past Surgical History   I have reviewed this patient's surgical history and updated it with pertinent information if needed.  Past Surgical History:   Procedure Laterality Date     CHOLECYSTECTOMY         Prior to Admission Medications   Prior to Admission Medications   Prescriptions Last Dose Informant Patient Reported? Taking?   Multiple Vitamins-Minerals (PRESERVISION AREDS) TABS 1/7/2023 at PM Self, Pharmacy Yes Yes   Sig: Take 1 tablet by mouth 2 times daily   amLODIPine (NORVASC) 5 MG tablet 1/7/2023 at AM Self, Pharmacy Yes Yes   Sig: Take 5 mg by mouth   aspirin (ASA) 81 MG EC tablet Past Month Self, Pharmacy Yes Yes   Sig: Take 81 mg by mouth daily   buPROPion (WELLBUTRIN XL) 300 MG 24 hr tablet 1/7/2023 at AM Self, Pharmacy Yes Yes   Sig: Take 300 mg by mouth   escitalopram (LEXAPRO) 10 MG tablet 1/7/2023 at PM Self, Pharmacy Yes Yes   Sig: Take 10 mg by mouth daily   glimepiride (AMARYL) 1 MG tablet 1/7/2023 at AM Self, Pharmacy Yes Yes   Sig: Take 2 mg by mouth every  morning (before breakfast)   omeprazole (PRILOSEC) 20 MG DR capsule 1/7/2023 at AM Self, Pharmacy Yes Yes   Sig: Take 20 mg by mouth daily   ondansetron (ZOFRAN) 4 MG tablet Past Month at PRN Self, Pharmacy Yes Yes   Sig: Take 4 mg by mouth every 8 hours as needed for nausea   polyethylene glycol (MIRALAX) 17 g packet Past Week Self, Pharmacy Yes Yes   Sig: Take 1 packet by mouth daily as needed for constipation   rosuvastatin (CRESTOR) 40 MG tablet 1/7/2023 at PM Self, Pharmacy Yes Yes   Sig: Take 40 mg by mouth daily   sulfaSALAzine (AZULFIDINE) 500 MG tablet 1/7/2023 at PM Self, Pharmacy Yes Yes   Sig: Take 1,000 mg by mouth 3 times daily      Facility-Administered Medications: None     Current Facility-Administered Medications   Medication Dose Route Frequency     aspirin  81 mg Oral Daily     buPROPion  300 mg Oral Daily     cefTRIAXone  1 g Intravenous Q24H     escitalopram  10 mg Oral Daily     fludrocortisone  0.1 mg Oral Daily     insulin aspart  1-7 Units Subcutaneous TID AC     insulin aspart  1-5 Units Subcutaneous At Bedtime     midodrine  5 mg Oral TID w/meals     multivitamin  with lutein  1 capsule Oral BID     pantoprazole  40 mg Oral QAM AC     rosuvastatin  40 mg Oral Daily     sodium chloride (PF)  3 mL Intracatheter Q8H     sulfaSALAzine  1,000 mg Oral TID     Current Facility-Administered Medications   Medication Last Rate     Allergies   Allergies   Allergen Reactions     Metformin Diarrhea       Social History    reports that he has quit smoking. His smoking use included cigarettes. He has never used smokeless tobacco. He reports that he does not currently use alcohol. He reports that he does not currently use drugs.    Family History   History reviewed. No pertinent family history.    Review of Systems   The comprehensive 10 point Review of Systems is negative other than noted in the HPI or here.     Physical Exam   Vital Signs with Ranges  Temp:  [98.2  F (36.8  C)-98.4  F (36.9  C)] 98.2  " F (36.8  C)  Pulse:  [85-99] 85  Resp:  [17-20] 18  BP: (114-132)/(64-90) 132/64  SpO2:  [96 %-97 %] 97 %  Wt Readings from Last 4 Encounters:   01/10/23 102.3 kg (225 lb 8 oz)     I/O last 3 completed shifts:  In: -   Out: 850 [Urine:850]      Vitals: /64 (BP Location: Right arm)   Pulse 85   Temp 98.2  F (36.8  C) (Oral)   Resp 18   Ht 1.88 m (6' 2\")   Wt 102.3 kg (225 lb 8 oz)   SpO2 97%   BMI 28.95 kg/m        GENERAL: Healthy, alert and no distress  EYES: Eyes grossly normal to inspection.  No discharge or erythema, or obvious scleral/conjunctival abnormalities.  RESP: No audible wheeze, cough, or visible cyanosis.  No visible retractions or increased work of breathing.    SKIN: Visible skin clear. No significant rash, abnormal pigmentation or lesions.  NEURO: Cranial nerves grossly intact.  Mentation and speech appropriate for age.  PSYCH: Mentation appears normal, affect normal/bright, judgement and insight intact, normal speech and appearance well-groomed.    No lab results found in last 7 days.    Invalid input(s): TROPONINIES    Recent Labs   Lab 01/10/23  0823 01/10/23  0146 01/09/23 2129 01/09/23  1713 01/09/23  0722 01/09/23  0637 01/08/23 2126 01/08/23  1009   WBC  --   --   --   --   --  6.8  --  7.6   HGB  --   --   --   --   --  11.3*  --  13.6   MCV  --   --   --   --   --  96  --  94   PLT  --   --   --   --   --  191  --  245   NA  --   --   --   --   --  139  --  136   POTASSIUM 3.9  --   --   --   --  4.2  --  4.1   CHLORIDE  --   --   --   --   --  107  --  101   CO2  --   --   --   --   --  26 --  23   BUN  --   --   --   --   --  12  --  17   CR  --   --   --   --   --  1.44*  --  1.60*   GFRESTIMATED  --   --   --   --   --  51*  --  45*   ANIONGAP  --   --   --   --   --  6  --  12   LORENZO  --   --   --   --   --  8.9  --  9.9   GLC  --  126* 165* 118*   < > 124*   < > 107*   ALBUMIN  --   --   --   --   --  2.7*  --  3.3*   PROTTOTAL  --   --   --   --   --   --   --  7.0 "   BILITOTAL  --   --   --   --   --   --   --  0.8   ALKPHOS  --   --   --   --   --   --   --  77   ALT  --   --   --   --   --   --   --  8   AST  --   --   --   --   --   --   --  10    < > = values in this interval not displayed.     No results for input(s): CHOL, HDL, LDL, TRIG, CHOLHDLRATIO in the last 11974 hours.  Recent Labs   Lab 01/09/23  0637 01/08/23  1009   WBC 6.8 7.6   HGB 11.3* 13.6   HCT 34.5* 41.2   MCV 96 94    245     No results for input(s): PH, PHV, PO2, PO2V, SAT, PCO2, PCO2V, HCO3, HCO3V in the last 168 hours.  No results for input(s): NTBNPI, NTBNP in the last 168 hours.  No results for input(s): DD in the last 168 hours.  No results for input(s): SED, CRP in the last 168 hours.  Recent Labs   Lab 01/09/23  0637 01/08/23  1009    245     Recent Labs   Lab 01/08/23  1009   TSH 2.29     Recent Labs   Lab 01/08/23  1828   COLOR Yellow   APPEARANCE Slightly Cloudy*   URINEGLC Negative   URINEBILI Negative   URINEKETONE Trace*   SG 1.022   UBLD Negative   URINEPH 5.5   PROTEIN 50*   NITRITE Negative   LEUKEST Large*   RBCU 3*   WBCU >182*       Imaging:  Recent Results (from the past 48 hour(s))   US Carotid Bilateral    Narrative    EXAM: US CAROTID BILATERAL  LOCATION: [facility]  DATE/TIME: 1/8/2023 8:50 PM    INDICATION: orthostatic hypotension, fall  COMPARISON: None.  TECHNIQUE: Duplex exam performed utilizing 2D gray-scale imaging, Doppler interrogation with color-flow and spectral waveform analysis. The percent diameter stenosis is determined using NASCET criteria and Society of Radiologists in Ultrasound Consensus   Criteria.    FINDINGS:    RIGHT: Moderate plaque at the bifurcation. The peak systolic velocity in the ICA is 125-230 cm/sec, consistent with 50-69% stenosis. Normal velocities in the ECA. Antegrade flow within the vertebral artery.     LEFT: Mild plaque at the bifurcation. The peak systolic velocity in the ICA is less than 125 cm/sec,  consistent with less than 50% stenosis. Normal velocities in the ECA. Antegrade flow within the vertebral artery.    VELOCITY CHART:  CCA   Right: 127 cm/s   Left: 103 cm/s  ICA   Right: 132 cm/s   Left: 88 cm/s  ECA   Right: 136 cm/s   Left: 151 cm/s  ICA/CCA PSV Ratio   Right: 1.0   Left: 0.9      Impression    IMPRESSION:  1.  Moderate plaque formation, velocities consistent with 50-69% stenosis in the right internal carotid artery.  2.  Mild plaque formation, velocities consistent with less than 50% stenosis in the left internal carotid artery.  3.  Flow within the vertebral arteries is antegrade.       Echo:  No results found for this or any previous visit (from the past 4320 hour(s)).

## 2023-01-11 ENCOUNTER — APPOINTMENT (OUTPATIENT)
Dept: PHYSICAL THERAPY | Facility: CLINIC | Age: 78
DRG: 312 | End: 2023-01-11
Payer: COMMERCIAL

## 2023-01-11 LAB
GLUCOSE BLDC GLUCOMTR-MCNC: 138 MG/DL (ref 70–99)
GLUCOSE BLDC GLUCOMTR-MCNC: 162 MG/DL (ref 70–99)
GLUCOSE BLDC GLUCOMTR-MCNC: 166 MG/DL (ref 70–99)
GLUCOSE BLDC GLUCOMTR-MCNC: 175 MG/DL (ref 70–99)
GLUCOSE BLDC GLUCOMTR-MCNC: 184 MG/DL (ref 70–99)
MAGNESIUM SERPL-MCNC: 1.9 MG/DL (ref 1.6–2.3)
POTASSIUM BLD-SCNC: 3.9 MMOL/L (ref 3.4–5.3)

## 2023-01-11 PROCEDURE — 83735 ASSAY OF MAGNESIUM: CPT | Performed by: INTERNAL MEDICINE

## 2023-01-11 PROCEDURE — 120N000001 HC R&B MED SURG/OB

## 2023-01-11 PROCEDURE — 250N000013 HC RX MED GY IP 250 OP 250 PS 637: Performed by: INTERNAL MEDICINE

## 2023-01-11 PROCEDURE — 84132 ASSAY OF SERUM POTASSIUM: CPT | Performed by: INTERNAL MEDICINE

## 2023-01-11 PROCEDURE — 36415 COLL VENOUS BLD VENIPUNCTURE: CPT | Performed by: INTERNAL MEDICINE

## 2023-01-11 PROCEDURE — 250N000011 HC RX IP 250 OP 636: Performed by: INTERNAL MEDICINE

## 2023-01-11 PROCEDURE — 97116 GAIT TRAINING THERAPY: CPT | Mod: GP

## 2023-01-11 PROCEDURE — 97530 THERAPEUTIC ACTIVITIES: CPT | Mod: GP

## 2023-01-11 PROCEDURE — 99232 SBSQ HOSP IP/OBS MODERATE 35: CPT | Performed by: INTERNAL MEDICINE

## 2023-01-11 RX ORDER — FLUDROCORTISONE ACETATE 0.1 MG/1
0.1 TABLET ORAL DAILY
Qty: 30 TABLET | Refills: 0 | Status: SHIPPED | OUTPATIENT
Start: 2023-01-11

## 2023-01-11 RX ORDER — MIDODRINE HYDROCHLORIDE 5 MG/1
5 TABLET ORAL
Qty: 90 TABLET | Refills: 0 | Status: SHIPPED | OUTPATIENT
Start: 2023-01-11

## 2023-01-11 RX ADMIN — FLUDROCORTISONE ACETATE 0.1 MG: 0.1 TABLET ORAL at 08:29

## 2023-01-11 RX ADMIN — Medication 1 CAPSULE: at 20:23

## 2023-01-11 RX ADMIN — SULFASALAZINE 1000 MG: 500 TABLET ORAL at 17:10

## 2023-01-11 RX ADMIN — CEFTRIAXONE SODIUM 1 G: 1 INJECTION, POWDER, FOR SOLUTION INTRAMUSCULAR; INTRAVENOUS at 21:34

## 2023-01-11 RX ADMIN — BUPROPION HYDROCHLORIDE 300 MG: 150 TABLET, FILM COATED, EXTENDED RELEASE ORAL at 08:29

## 2023-01-11 RX ADMIN — ROSUVASTATIN CALCIUM 40 MG: 20 TABLET, FILM COATED ORAL at 20:23

## 2023-01-11 RX ADMIN — SULFASALAZINE 1000 MG: 500 TABLET ORAL at 21:34

## 2023-01-11 RX ADMIN — CARBIDOPA AND LEVODOPA 5 MG: 50; 200 TABLET, EXTENDED RELEASE ORAL at 08:29

## 2023-01-11 RX ADMIN — ESCITALOPRAM OXALATE 10 MG: 10 TABLET ORAL at 20:23

## 2023-01-11 RX ADMIN — CARBIDOPA AND LEVODOPA 5 MG: 50; 200 TABLET, EXTENDED RELEASE ORAL at 17:10

## 2023-01-11 RX ADMIN — SULFASALAZINE 1000 MG: 500 TABLET ORAL at 08:29

## 2023-01-11 RX ADMIN — ASPIRIN 81 MG: 81 TABLET, COATED ORAL at 08:29

## 2023-01-11 RX ADMIN — PANTOPRAZOLE SODIUM 40 MG: 40 TABLET, DELAYED RELEASE ORAL at 05:37

## 2023-01-11 RX ADMIN — CARBIDOPA AND LEVODOPA 5 MG: 50; 200 TABLET, EXTENDED RELEASE ORAL at 11:49

## 2023-01-11 RX ADMIN — Medication 1 CAPSULE: at 08:29

## 2023-01-11 ASSESSMENT — ACTIVITIES OF DAILY LIVING (ADL)
ADLS_ACUITY_SCORE: 27
ADLS_ACUITY_SCORE: 26
ADLS_ACUITY_SCORE: 26
ADLS_ACUITY_SCORE: 27
ADLS_ACUITY_SCORE: 26
ADLS_ACUITY_SCORE: 27
ADLS_ACUITY_SCORE: 26
ADLS_ACUITY_SCORE: 27
ADLS_ACUITY_SCORE: 27

## 2023-01-11 NOTE — PLAN OF CARE
Goal Outcome Evaluation:    Date & Time: 1/10 [id number]  Orientation: A&Ox4  Activity Level: SBA with cane  Fall Risk: yes  Behavior & Aggression: green  Pain Management: denies pain  ABNL VS/O2: VSS on RA, /55, + orthostatics, denies dizziness   Tele:SR with Occ PVCs  ABNL Lab/BG: , 138  Diet: mod carb  Bowel/Bladder: continent, uses urinal at bedside, pt had a medium BM this AM  Skin: compression socks in place BLE  Drains/Devices: PIV SL with intermittent antibiotics   Tests/Procedures: none  Anticipated DC Date: pending today home  Other Important Info:

## 2023-01-11 NOTE — PLAN OF CARE
Goal Outcome Evaluation: Pt A&Ox4. VSS on Ra, positive orthostatic hypotension improving not symptomatic. Up SBA. Denies pain.  Compression stockings in place. Tolerating Mod CHO diet. BGM stable no insulin correction needed.  PIV SL with intermittent abx. Using urinal at bedside with adequate output. Cards signed off. Tele ST with occ PVC.  Participated in PT today.  Plan to discharge to home tomorrow.

## 2023-01-11 NOTE — PROGRESS NOTES
"Care Management Follow Up    Length of Stay (days): 3    Expected Discharge Date: 01/11/2023     Concerns to be Addressed:       Patient plan of care discussed at interdisciplinary rounds: Yes    Anticipated Discharge Disposition: Home     Anticipated Discharge Services: None  Anticipated Discharge DME:      Patient/family educated on Medicare website which has current facility and service quality ratings:    Education Provided on the Discharge Plan:    Patient/Family in Agreement with the Plan: yes    Referrals Placed by CM/SW:    Private pay costs discussed: Not applicable    Additional Information:  Writer was updated by RNCC that patient needs TCU on discharge. Writer called into patient's room to discuss recommendation. Patient reports he was just at [facility]CU but was \"kicked out due to my insurance\". Patient agreeable for a referral to be sent to [facility] of facilities near that area. Writer explained once we find a TCU we will need to get insurance authorization, patient is understanding. Referrals sent via DOD.     Addendum 1138: VM from [facility] asking if patient uses a CPAP. Per chart review, nursing flow sheets, patient has not been on CPAP. Writer asked Charge Rn if she knows if patient is using CPAP here. Charge RN stated that the bedside RN does not believe patient is on CPAP. Writer asked that the bedside nurse ask patient at bedside. Writer attempted to call into the room to verify but line was busy. Bedside nursing confirmed with patient that he wears a CPAP. Writer updated [facility]. She will review referral.     Addendum 1500: Writer received a call from [facility], they can accept patient for tomorrow pending auth. Private room fee is $45 an hour. Writer stated she would call patient to discuss. Patient stated he would prefer a shared room. Writer stated she would inquire to [facility] to see. Writer called [facility] back. [facility] stated that they actually have the private room for patient " with NO private room fee due to CPAP. Ressa will start auth. Anticipate a discharge to [facility] tomorrow pending insurance authorization. Ressa asking writer to fax updated PT note from today to her at [phone]. Writer faxed notes. Writer called and updated patient regarding facility pursing auth- patient agreeable and aware.     Andria Valenzuela, PETERSON, [facility]   Social Work   [facility]

## 2023-01-11 NOTE — PROVIDER NOTIFICATION
Dr Giraldo notified at 1130 for     Positive orthostatic BP. Pt denies dizzy but reported feeling weak after walking in halls.        Coronary artery disease involving native coronary artery of native heart without angina pectoris

## 2023-01-11 NOTE — PLAN OF CARE
A&Ox4. VSS on RA except positive orthostatic BP/HR. MD aware. Denies dizzy or lightheaded but unsteady on feet at times. Denies chest pain and SOB. Tele SR with PVCs. Tolerating mod carb diet. Voiding to B/R. Up Ax1 walker+GB. Ambulated in halls. Compression stockings on. Discharge to TCU pending placement.

## 2023-01-11 NOTE — PROGRESS NOTES
[facility]    Hospitalist Progress Note    Brief Summary:   This is a 77-year-old male with history of diabetes mellitus type 2, depression, diastolic congestive heart failure, chronic kidney disease stage III, Crohn's disease, hypertension, dextrocardia came to the ER with complaint of dizziness and lightheadedness.    Assessment & Plan     1.  Significant orthostatic hypotension:     This is a 77-year-old male with history of diabetes for a long time, uncontrolled with A1c of 8.1.  Does have peripheral neuropathy. The causes of orthostatic hypotension is multifactorial, related to  volume depletion, continued taking his amlodipine at home and likely some autonomic neuropathy given his diabetes. Stop both metoprolol and amlodipine now, compression stocking thigh high in place and started on low dose Midodrine 2.5 mg TID.     Although improve symptomatically but still have significant orthostatic Hypotension till 1/9/23, I did increase his Midodrine to 5 mg TID and add Florinef 0.1 mg daily, stop IV fluids now, continue to check Orthostatic blood pressure. Cortisol morning and random normal.   Echo unremarkable with normal EF.  US carotid shows some stenosis but not significant, serial troponin remain negative.   At this time with Midodrine 5 mg TID and Florinef and lower extremity compression stocking his Orthostatic hypotension significantly improve, able to walk with the therapy on 1/10 and recommend home with assist, now today he is more unstable on his gait and has more orthostatic hypotension.    At this time, I asked to use Midodrine and Florinef early morning at 7 AM and check orthostatic blood pressure at least 1 hr after the medications. He is not symptomatic with dizziness or lightheadedness, he does have some neuropathy, continue with therapy and now therapy recommending TCU      2.  Diabetes mellitus type 2:  He is on glimepiride.  We will hold the glimepiride.  Keep him on  sliding scale insulin for correction and hypoglycemia protocol. BS reasonably control.     3.  Chronic kidney disease stage III:  Baseline creatinine 1.4-1.5, slightly high, 1.6 on admission, improve to his baseline with IV fluids, , mostly because of orthostatic hypotension.  stop IV fluids now.     4.  History of diastolic congestive heart failure:  Does not look like in any acute exacerbation at this time.  We do not have any echocardiogram in our records or a recent echo in the Care Everywhere, we will do the echocardiogram as a workup for his orthostatic as well. Echo pending.     5.  Hyperlipidemia, on Crestor:  We will continue with that.    6.  History of Crohn's disease:  He has been much better controlled at this time.  He was recently on prednisone.  Not on any prednisone anymore.  Continue with sulfasalazine.  Random cortisol level normal yesterday, morning cortisol for this AM is pending.     7.  Hypertension:  Apparently continued to take amlodipine, despite significant orthostatic hypotension, we will discontinue that.  started him on Midodrine .  There is risk of supine hypertension.  So recommended to have his head of the bed, always elevated to 30-40 degrees most of the time, even when sleeping.    8.  Possible UTI: UA abnormal but urine culture remain negative, started on IV Ceftriaxone now.       DVT Prophylaxis: Pneumatic Compression Devices  Code Status: Full Code     Disposition: Expected discharge tomorrow          Joe Giraldo MD, MD  Text Page  (7am - 6pm)    Interval History   Patient told me that he is more wobbly when he was ambulating today. Denies any chest pain, SOB, fever, chills, nausea or vomiting.     No other significant event overnight.     -Data reviewed today: I reviewed all new labs and imaging results over the last 24 hours. I personally reviewed no images or EKG's today.    Physical Exam   Temp: 98.1  F (36.7  C) Temp src: Oral BP: 124/86 Pulse: 84   Resp: 16 SpO2: 97 % O2  Device: None (Room air)    Vitals:    01/09/23 0141 01/10/23 0429 01/11/23 0458   Weight: 102 kg (224 lb 14.4 oz) 102.3 kg (225 lb 8 oz) 102.1 kg (225 lb 1.4 oz)     Vital Signs with Ranges  Temp:  [98.1  F (36.7  C)-98.7  F (37.1  C)] 98.1  F (36.7  C)  Pulse:  [] 84  Resp:  [16] 16  BP: ()/(48-86) 124/86  SpO2:  [94 %-97 %] 97 %  I/O last 3 completed shifts:  In: 480 [P.O.:480]  Out: 1500 [Urine:1500]    Constitutional: awake, alert, cooperative, no apparent distress, and appears stated age  Eyes: Lids and lashes normal, pupils equal, round and reactive to light, extra ocular muscles intact, sclera clear, conjunctiva normal  Respiratory: No increased work of breathing, good air exchange, clear to auscultation bilaterally, no crackles or wheezing  Cardiovascular: Normal apical impulse, regular rate and rhythm, normal S1 and S2, no S3 or S4, and no murmur noted  GI: No scars, normal bowel sounds, soft, non-distended, non-tender, no masses palpated, no hepatosplenomegally  Musculoskeletal: no lower extremity pitting edema present  Neurologic: no focal deficit.     Medications       aspirin  81 mg Oral Daily     buPROPion  300 mg Oral Daily     cefTRIAXone  1 g Intravenous Q24H     escitalopram  10 mg Oral Daily     fludrocortisone  0.1 mg Oral Daily     insulin aspart  1-7 Units Subcutaneous TID AC     insulin aspart  1-5 Units Subcutaneous At Bedtime     midodrine  5 mg Oral TID w/meals     multivitamin  with lutein  1 capsule Oral BID     pantoprazole  40 mg Oral QAM AC     rosuvastatin  40 mg Oral Daily     sodium chloride (PF)  3 mL Intracatheter Q8H     sulfaSALAzine  1,000 mg Oral TID       Data   Recent Labs   Lab 01/11/23  1144 01/11/23  0735 01/11/23  0719 01/11/23  0141 01/10/23  1200 01/10/23  0823 01/09/23  0722 01/09/23  0637 01/08/23  2126 01/08/23  1009   WBC  --   --   --   --   --   --   --  6.8  --  7.6   HGB  --   --   --   --   --   --   --  11.3*  --  13.6   MCV  --   --   --   --    --   --   --  96  --  94   PLT  --   --   --   --   --   --   --  191  --  245   NA  --   --   --   --   --   --   --  139  --  136   POTASSIUM  --  3.9  --   --   --  3.9  --  4.2  --  4.1   CHLORIDE  --   --   --   --   --   --   --  107  --  101   CO2  --   --   --   --   --   --   --  26  --  23   BUN  --   --   --   --   --   --   --  12  --  17   CR  --   --   --   --   --   --   --  1.44*  --  1.60*   ANIONGAP  --   --   --   --   --   --   --  6  --  12   LORENZO  --   --   --   --   --   --   --  8.9  --  9.9   *  --  162* 138*   < >  --    < > 124*   < > 107*   ALBUMIN  --   --   --   --   --   --   --  2.7*  --  3.3*   PROTTOTAL  --   --   --   --   --   --   --   --   --  7.0   BILITOTAL  --   --   --   --   --   --   --   --   --  0.8   ALKPHOS  --   --   --   --   --   --   --   --   --  77   ALT  --   --   --   --   --   --   --   --   --  8   AST  --   --   --   --   --   --   --   --   --  10    < > = values in this interval not displayed.       No results found for this or any previous visit (from the past 24 hour(s)).   No

## 2023-01-12 VITALS
SYSTOLIC BLOOD PRESSURE: 124 MMHG | HEIGHT: 74 IN | RESPIRATION RATE: 16 BRPM | BODY MASS INDEX: 28.9 KG/M2 | WEIGHT: 225.2 LBS | DIASTOLIC BLOOD PRESSURE: 59 MMHG | TEMPERATURE: 98.4 F | OXYGEN SATURATION: 96 % | HEART RATE: 81 BPM

## 2023-01-12 LAB
ALBUMIN SERPL-MCNC: 2.5 G/DL (ref 3.4–5)
ANION GAP SERPL CALCULATED.3IONS-SCNC: 7 MMOL/L (ref 3–14)
BASOPHILS # BLD AUTO: 0 10E3/UL (ref 0–0.2)
BASOPHILS NFR BLD AUTO: 1 %
BUN SERPL-MCNC: 13 MG/DL (ref 7–30)
CALCIUM SERPL-MCNC: 8.8 MG/DL (ref 8.5–10.1)
CHLORIDE BLD-SCNC: 102 MMOL/L (ref 94–109)
CO2 SERPL-SCNC: 25 MMOL/L (ref 20–32)
CREAT SERPL-MCNC: 1.21 MG/DL (ref 0.66–1.25)
EOSINOPHIL # BLD AUTO: 0.2 10E3/UL (ref 0–0.7)
EOSINOPHIL NFR BLD AUTO: 3 %
ERYTHROCYTE [DISTWIDTH] IN BLOOD BY AUTOMATED COUNT: 16.5 % (ref 10–15)
GFR SERPL CREATININE-BSD FRML MDRD: 62 ML/MIN/1.73M2
GLUCOSE BLD-MCNC: 167 MG/DL (ref 70–99)
GLUCOSE BLDC GLUCOMTR-MCNC: 155 MG/DL (ref 70–99)
GLUCOSE BLDC GLUCOMTR-MCNC: 208 MG/DL (ref 70–99)
GLUCOSE BLDC GLUCOMTR-MCNC: 218 MG/DL (ref 70–99)
HCT VFR BLD AUTO: 33.1 % (ref 40–53)
HGB BLD-MCNC: 11 G/DL (ref 13.3–17.7)
IMM GRANULOCYTES # BLD: 0 10E3/UL
IMM GRANULOCYTES NFR BLD: 1 %
LYMPHOCYTES # BLD AUTO: 0.9 10E3/UL (ref 0.8–5.3)
LYMPHOCYTES NFR BLD AUTO: 16 %
MAGNESIUM SERPL-MCNC: 1.7 MG/DL (ref 1.6–2.3)
MCH RBC QN AUTO: 31.2 PG (ref 26.5–33)
MCHC RBC AUTO-ENTMCNC: 33.2 G/DL (ref 31.5–36.5)
MCV RBC AUTO: 94 FL (ref 78–100)
MONOCYTES # BLD AUTO: 0.8 10E3/UL (ref 0–1.3)
MONOCYTES NFR BLD AUTO: 13 %
NEUTROPHILS # BLD AUTO: 3.8 10E3/UL (ref 1.6–8.3)
NEUTROPHILS NFR BLD AUTO: 66 %
NRBC # BLD AUTO: 0 10E3/UL
NRBC BLD AUTO-RTO: 0 /100
PHOSPHATE SERPL-MCNC: 2.7 MG/DL (ref 2.5–4.5)
PLATELET # BLD AUTO: 208 10E3/UL (ref 150–450)
POTASSIUM BLD-SCNC: 4 MMOL/L (ref 3.4–5.3)
RBC # BLD AUTO: 3.53 10E6/UL (ref 4.4–5.9)
SODIUM SERPL-SCNC: 134 MMOL/L (ref 133–144)
WBC # BLD AUTO: 5.8 10E3/UL (ref 4–11)

## 2023-01-12 PROCEDURE — 85025 COMPLETE CBC W/AUTO DIFF WBC: CPT | Performed by: INTERNAL MEDICINE

## 2023-01-12 PROCEDURE — 99239 HOSP IP/OBS DSCHRG MGMT >30: CPT | Performed by: INTERNAL MEDICINE

## 2023-01-12 PROCEDURE — 36415 COLL VENOUS BLD VENIPUNCTURE: CPT | Performed by: INTERNAL MEDICINE

## 2023-01-12 PROCEDURE — 250N000013 HC RX MED GY IP 250 OP 250 PS 637: Performed by: INTERNAL MEDICINE

## 2023-01-12 PROCEDURE — 80069 RENAL FUNCTION PANEL: CPT | Performed by: INTERNAL MEDICINE

## 2023-01-12 PROCEDURE — 83735 ASSAY OF MAGNESIUM: CPT | Performed by: INTERNAL MEDICINE

## 2023-01-12 RX ADMIN — ASPIRIN 81 MG: 81 TABLET, COATED ORAL at 08:33

## 2023-01-12 RX ADMIN — BUPROPION HYDROCHLORIDE 300 MG: 150 TABLET, FILM COATED, EXTENDED RELEASE ORAL at 08:46

## 2023-01-12 RX ADMIN — Medication 1 CAPSULE: at 08:33

## 2023-01-12 RX ADMIN — FLUDROCORTISONE ACETATE 0.1 MG: 0.1 TABLET ORAL at 06:04

## 2023-01-12 RX ADMIN — SULFASALAZINE 1000 MG: 500 TABLET ORAL at 15:40

## 2023-01-12 RX ADMIN — PANTOPRAZOLE SODIUM 40 MG: 40 TABLET, DELAYED RELEASE ORAL at 08:32

## 2023-01-12 RX ADMIN — CARBIDOPA AND LEVODOPA 5 MG: 50; 200 TABLET, EXTENDED RELEASE ORAL at 08:33

## 2023-01-12 RX ADMIN — CARBIDOPA AND LEVODOPA 5 MG: 50; 200 TABLET, EXTENDED RELEASE ORAL at 13:16

## 2023-01-12 RX ADMIN — SULFASALAZINE 1000 MG: 500 TABLET ORAL at 08:33

## 2023-01-12 ASSESSMENT — ACTIVITIES OF DAILY LIVING (ADL)
ADLS_ACUITY_SCORE: 26

## 2023-01-12 NOTE — DISCHARGE SUMMARY
[facility]    Discharge Summary  Hospitalist    Date of Admission:  1/8/2023  Date of Discharge:  1/12/2023  Discharging Provider: Joe Giraldo MD, MD  Date of Service (when I saw the patient): 01/12/23    Discharge Diagnoses   Please refer below     History of Present Illness   Jan Hernandez is an 77 year old male who presented with dizziness and lightheadedness.    Hospital Course    This is a 77-year-old male with history of diabetes mellitus type 2, depression, diastolic congestive heart failure, chronic kidney disease stage III, Crohn's disease, hypertension, dextrocardia came to the ER with complaint of dizziness and lightheadedness.     Final Discharge Diagnosis and Hospital Course       1.  Significant/Symptomatic Orthostatic Hypotension: Now Improved.       This is a 77-year-old male with history of diabetes for a long time, uncontrolled with A1c of 8.1.  Does have peripheral neuropathy. The causes of orthostatic hypotension is multifactorial, related to  volume depletion, continued taking his amlodipine at home and likely some autonomic neuropathy given his diabetes. Stop both metoprolol and amlodipine now, compression stocking thigh high in place and started on low dose Midodrine 2.5 mg TID.      Although improve symptomatically but still have significant orthostatic Hypotension till 1/9/23, I did increase his Midodrine to 5 mg TID and add Florinef 0.1 mg daily, stop IV fluids now, continue to check Orthostatic blood pressure. Cortisol morning and random normal.   Echo unremarkable with normal EF.  US carotid shows some stenosis but not significant, serial troponin remain negative.   At this time with Midodrine 5 mg TID and Florinef and lower extremity compression stocking his Orthostatic hypotension significantly improve, able to walk with the therapy on 1/10 an again today and recommend TCU at this time.        At this time, I asked to use Midodrine and Florinef early morning  at 7 AM and check orthostatic blood pressure at least 1 hr after the medications. He is not symptomatic with dizziness or lightheadedness, he does have some neuropathy, continue with therapy and now therapy recommending TCU. He will be discharge to TCU in stable and improve condition. On day of discharge feeling well, no dizziness or lightheadedness, ambulate well with the therapy today. Discharge to TCU in stable condition .     2.  Diabetes mellitus type 2:  He is on glimepiride.  resume  Glimepiride on discharge.  Keep him on sliding scale insulin for correction and hypoglycemia protocol. BS reasonably control.      3.  Chronic kidney disease stage III:  Baseline creatinine 1.4-1.5, slightly high, 1.6 on admission, improve to his baseline with IV fluids, , mostly because of orthostatic hypotension.  stop IV fluids now.      4.  History of diastolic congestive heart failure:  stable      5.  Hyperlipidemia, on Crestor:  We will continue with that.     6.  History of Crohn's disease:  He has been much better controlled at this time.  He was recently on prednisone.  Not on any prednisone anymore.  Continue with sulfasalazine.  Random and AM cortisol level normal     7.  Hypertension:  Apparently continued to take amlodipine, despite significant orthostatic hypotension, we will discontinue that.  started him on Midodrine .  There is risk of supine hypertension.  So recommended to have his head of the bed, always elevated to 30-40 degrees most of the time, even when sleeping.     8.  Possible UTI: UA abnormal but urine culture remain negative, started on IV Ceftriaxone now stopped after 3 doses, no UTI.        Code Status: Full Code     Disposition: To TCU        Joe Giraldo MD, MD    Significant Results and Procedures       Pending Results   These results will be followed up by PCP  Unresulted Labs Ordered in the Past 30 Days of this Admission     No orders found from 12/9/2022 to 1/9/2023.          Code Status    Full Code       Primary Care Physician   Michele Sargent    Physical Exam   Temp: 98.4  F (36.9  C) Temp src: Oral BP: 124/59 Pulse: 81   Resp: 16 SpO2: 96 % O2 Device: None (Room air)    Vitals:    01/10/23 0429 01/11/23 0458 01/12/23 0614   Weight: 102.3 kg (225 lb 8 oz) 102.1 kg (225 lb 1.4 oz) 102.2 kg (225 lb 3.2 oz)     Vital Signs with Ranges  Temp:  [98.3  F (36.8  C)-98.4  F (36.9  C)] 98.4  F (36.9  C)  Pulse:  [81-89] 81  Resp:  [16] 16  BP: (108-124)/(55-62) 124/59  SpO2:  [96 %-98 %] 96 %  I/O last 3 completed shifts:  In: 480 [P.O.:480]  Out: 950 [Urine:950]    Constitutional: awake, alert, cooperative, no apparent distress, and appears stated age  Eyes: Lids and lashes normal, pupils equal, round and reactive to light, extra ocular muscles intact, sclera clear, conjunctiva normal  Respiratory: No increased work of breathing, good air exchange, clear to auscultation bilaterally, no crackles or wheezing  Cardiovascular: Normal apical impulse, regular rate and rhythm, normal S1 and S2, no S3 or S4, and no murmur noted  GI: No scars, normal bowel sounds, soft, non-distended, non-tender, no masses palpated, no hepatosplenomegally  Musculoskeletal: no lower extremity pitting edema present  Neurologic: no focal deficit.     Discharge Disposition   Discharged to short-term care facility  Condition at discharge: Stable    Consultations This Hospital Stay   CARE MANAGEMENT / SOCIAL WORK IP CONSULT  PHYSICAL THERAPY ADULT IP CONSULT  OCCUPATIONAL THERAPY ADULT IP CONSULT  CARDIOLOGY IP CONSULT  PHYSICAL THERAPY ADULT IP CONSULT  OCCUPATIONAL THERAPY ADULT IP CONSULT    Time Spent on this Encounter   Joe MATTHEWS MD, personally saw the patient today and spent greater than 30 minutes discharging this patient.    Discharge Orders      Reason for your hospital stay    Orthostatic hypotension with dizziness and lightheadedness     Activity    Your activity upon discharge: activity as tolerated     Follow-up and  recommended labs and tests     Follow up with primary care provider, Michele Sargent, within 7 days for hospital follow- up.  The following labs/tests are recommended: cbc,bmp.     General info for SNF    Length of Stay Estimate: Short Term Care: Estimated # of Days <30  Condition at Discharge: Stable  Level of care:skilled   Rehabilitation Potential: Good  Admission H&P remains valid and up-to-date: Yes  Recent Chemotherapy: N/A  Use Nursing Home Standing Orders: Yes     Mantoux instructions    Give two-step Mantoux (PPD) Per Facility Policy Yes     Follow Up and recommended labs and tests    Follow up with Nursing home physician.  No follow up labs or test are needed.     Reason for your hospital stay    Orthostatic Hypotension     Intake and output    Every shift     Daily weights    Call Provider for weight gain of more than 2 pounds per day or 5 pounds per week.     Activity - Up with assistive device     Activity - Up with nursing assistance     Additional Discharge Instructions    Use CPAP at night for sleep apnea at home settings     Full Code     Physical Therapy Adult Consult    Evaluate and treat as clinically indicated.    Reason:  weakness     Occupational Therapy Adult Consult    Evaluate and treat as clinically indicated.    Reason:  weakness     Fall precautions     Diet    Follow this diet upon discharge: Orders Placed This Encounter      Combination Diet Regular Diet Adult; Moderate Consistent Carb (60 g CHO per Meal) Diet     Diet    Follow this diet upon discharge: Orders Placed This Encounter      Combination Diet Regular Diet Adult; Moderate Consistent Carb (60 g CHO per Meal) Diet      Diet     Discharge Medications   Current Discharge Medication List      START taking these medications    Details   fludrocortisone (FLORINEF) 0.1 MG tablet Take 1 tablet (0.1 mg) by mouth daily  Qty: 30 tablet, Refills: 0    Associated Diagnoses: Orthostatic hypotension      midodrine (PROAMATINE) 5 MG tablet  Take 1 tablet (5 mg) by mouth 3 times daily (with meals)  Qty: 90 tablet, Refills: 0    Associated Diagnoses: Orthostatic hypotension         CONTINUE these medications which have NOT CHANGED    Details   aspirin (ASA) 81 MG EC tablet Take 81 mg by mouth daily      buPROPion (WELLBUTRIN XL) 300 MG 24 hr tablet Take 300 mg by mouth      escitalopram (LEXAPRO) 10 MG tablet Take 10 mg by mouth daily      glimepiride (AMARYL) 1 MG tablet Take 2 mg by mouth every morning (before breakfast)      Multiple Vitamins-Minerals (PRESERVISION AREDS) TABS Take 1 tablet by mouth 2 times daily      omeprazole (PRILOSEC) 20 MG DR capsule Take 20 mg by mouth daily      ondansetron (ZOFRAN) 4 MG tablet Take 4 mg by mouth every 8 hours as needed for nausea      polyethylene glycol (MIRALAX) 17 g packet Take 1 packet by mouth daily as needed for constipation      rosuvastatin (CRESTOR) 40 MG tablet Take 40 mg by mouth daily      sulfaSALAzine (AZULFIDINE) 500 MG tablet Take 1,000 mg by mouth 3 times daily         STOP taking these medications       amLODIPine (NORVASC) 5 MG tablet Comments:   Reason for Stopping:             Allergies   Allergies   Allergen Reactions     Metformin Diarrhea     Data   Most Recent 3 CBC's:Recent Labs   Lab Test 01/12/23  0723 01/09/23  0637 01/08/23  1009   WBC 5.8 6.8 7.6   HGB 11.0* 11.3* 13.6   MCV 94 96 94    191 245      Most Recent 3 BMP's:  Recent Labs   Lab Test 01/12/23  1315 01/12/23  1133 01/12/23  0723 01/11/23  1144 01/11/23  0735 01/10/23  1200 01/10/23  0823 01/09/23  0722 01/09/23  0637 01/08/23  2126 01/08/23  1009   NA  --   --  134  --   --   --   --   --  139  --  136   POTASSIUM  --   --  4.0  --  3.9  --  3.9  --  4.2  --  4.1   CHLORIDE  --   --  102  --   --   --   --   --  107  --  101   CO2  --   --  25  --   --   --   --   --  26  --  23   BUN  --   --  13  --   --   --   --   --  12  --  17   CR  --   --  1.21  --   --   --   --   --  1.44*  --  1.60*   ANIONGAP  --   --   7  --   --   --   --   --  6  --  12   LORENZO  --   --  8.8  --   --   --   --   --  8.9  --  9.9   * 218* 167*  155*   < >  --    < >  --    < > 124*   < > 107*    < > = values in this interval not displayed.     Most Recent 2 LFT's:  Recent Labs   Lab Test 01/08/23  1009   AST 10   ALT 8   ALKPHOS 77   BILITOTAL 0.8     Most Recent INR's and Anticoagulation Dosing History:  Anticoagulation Dose History    There is no flowsheet data to display.       Most Recent 3 Troponin's:No lab results found.  Most Recent Cholesterol Panel:No lab results found.  Most Recent 6 Bacteria Isolates From Any Culture (See EPIC Reports for Culture Details):No lab results found.  Most Recent TSH, T4 and A1c Labs:  Recent Labs   Lab Test 01/08/23  1009   TSH 2.29   A1C 8.1*     Results for orders placed or performed during the hospital encounter of 01/08/23   US Carotid Bilateral    Narrative    EXAM: US CAROTID BILATERAL  LOCATION: [facility]  DATE/TIME: 1/8/2023 8:50 PM    INDICATION: orthostatic hypotension, fall  COMPARISON: None.  TECHNIQUE: Duplex exam performed utilizing 2D gray-scale imaging, Doppler interrogation with color-flow and spectral waveform analysis. The percent diameter stenosis is determined using NASCET criteria and Society of Radiologists in Ultrasound Consensus   Criteria.    FINDINGS:    RIGHT: Moderate plaque at the bifurcation. The peak systolic velocity in the ICA is 125-230 cm/sec, consistent with 50-69% stenosis. Normal velocities in the ECA. Antegrade flow within the vertebral artery.     LEFT: Mild plaque at the bifurcation. The peak systolic velocity in the ICA is less than 125 cm/sec, consistent with less than 50% stenosis. Normal velocities in the ECA. Antegrade flow within the vertebral artery.    VELOCITY CHART:  CCA   Right: 127 cm/s   Left: 103 cm/s  ICA   Right: 132 cm/s   Left: 88 cm/s  ECA   Right: 136 cm/s   Left: 151 cm/s  ICA/CCA PSV Ratio   Right: 1.0   Left:  0.9      Impression    IMPRESSION:  1.  Moderate plaque formation, velocities consistent with 50-69% stenosis in the right internal carotid artery.  2.  Mild plaque formation, velocities consistent with less than 50% stenosis in the left internal carotid artery.  3.  Flow within the vertebral arteries is antegrade.   Echocardiogram Complete     Value    LVEF  55-60%    [facility]    [id number]  [id number]  [id number]  [id number]^JOVANI^PANCHITO^GAVIN     [facility]  Echocardiography Laboratory  [facility] [place] [address]     Name: YAA BYRD  MRN: [id number]  : 1945  Study Date: 2023 01:43 PM  Age: 77 yrs  Gender: Male  Patient Location: [facility]  Reason For Study: Other Diseases of Pericardium  Ordering Physician: PANCHITO HAHN  Referring Physician: Michele Sargent  Performed By: [place]     BSA: 2.3 m2  Height: 74 in  Weight: 222 lb  HR: 94  BP: 125/65 mmHg  ______________________________________________________________________________  Procedure  Complete Portable Echo Adult.  ______________________________________________________________________________  Interpretation Summary     Pt has situs inversus, very challenging imaging. Contrast not administered.  1. Normal biventricular size and function. Left ventricular ejection fraction  of 55-60%.  2. No hemodynamically significant valvular disease.  No prior study for comparison.  ______________________________________________________________________________  Left Ventricle  The left ventricle is normal in size. There is concentric remodeling present.  Left ventricular systolic function is normal. The visual ejection fraction is  55-60%. Grade I or early diastolic dysfunction. Regional wall motion  abnormalities cannot be excluded due to limited visualization.     Right Ventricle  The right ventricle is normal in size and function.     Atria  Normal left atrial size. Right atrial size is normal.     Mitral Valve  The  mitral valve leaflets appear normal. There is no evidence of stenosis,  fluttering, or prolapse. There is trace mitral regurgitation.     Tricuspid Valve  Normal tricuspid valve. There is trace tricuspid regurgitation. Right  ventricular systolic pressure could not be approximated due to inadequate  tricuspid regurgitation.     Aortic Valve  There is mild trileaflet aortic sclerosis. No aortic stenosis is present.     Pulmonic Valve  The pulmonic valve is not well visualized.     Vessels  Normal size aorta. Normal size ascending aorta. Inferior vena cava not well  visualized for estimation of right atrial pressure.     Pericardium  There is no pericardial effusion.     Rhythm  Sinus rhythm was noted.  ______________________________________________________________________________  MMode/2D Measurements & Calculations  IVSd: 1.2 cm     LVIDd: 4.3 cm  LVIDs: 2.9 cm  LVPWd: 1.0 cm  FS: 33.6 %  LV mass(C)d: 162.3 grams  LV mass(C)dI: 71.4 grams/m2  Ao root diam: 3.3 cm  LA dimension: 3.9 cm  asc Aorta Diam: 3.3 cm  LA/Ao: 1.2  LVOT diam: 2.1 cm  LVOT area: 3.4 cm2  LA Volume (BP): 42.3 ml  LA Volume Index (BP): 18.6 ml/m2  RWT: 0.47     Doppler Measurements & Calculations  MV E max francia: 69.4 cm/sec  MV A max francia: 117.4 cm/sec  MV E/A: 0.59  MV dec slope: 492.6 cm/sec2  MV dec time: 0.14 sec  PA acc time: 0.08 sec  E/E' avg: 10.4  Lateral E/e': 7.4  Medial E/e': 13.3     ______________________________________________________________________________  Report approved by: James Moon 01/09/2023 03:48 PM             Most Recent 3 CBC's:Recent Labs   Lab Test 01/12/23  0723 01/09/23  0637 01/08/23  1009   WBC 5.8 6.8 7.6   HGB 11.0* 11.3* 13.6   MCV 94 96 94    191 245     Most Recent 3 BMP's:Recent Labs   Lab Test 01/12/23  1315 01/12/23  1133 01/12/23  0723 01/11/23  1144 01/11/23  0735 01/10/23  1200 01/10/23  0823 01/09/23  0722 01/09/23  0637 01/08/23  2126 01/08/23  1009   NA  --   --  134  --   --   --    --   --  139  --  136   POTASSIUM  --   --  4.0  --  3.9  --  3.9  --  4.2  --  4.1   CHLORIDE  --   --  102  --   --   --   --   --  107  --  101   CO2  --   --  25  --   --   --   --   --  26  --  23   BUN  --   --  13  --   --   --   --   --  12  --  17   CR  --   --  1.21  --   --   --   --   --  1.44*  --  1.60*   ANIONGAP  --   --  7  --   --   --   --   --  6  --  12   LORENZO  --   --  8.8  --   --   --   --   --  8.9  --  9.9   * 218* 167*  155*   < >  --    < >  --    < > 124*   < > 107*    < > = values in this interval not displayed.     Most Recent 2 LFT's:Recent Labs   Lab Test 01/08/23  1009   AST 10   ALT 8   ALKPHOS 77   BILITOTAL 0.8

## 2023-01-12 NOTE — DISCHARGE SUMMARY
Pt here with orthostatic hypotension. A&Ox4. VSS. Tele NSR. Mod Carb diet. Up with A1 and walker. Contient of bowel and bladder with urinal at bedside. Denies pain. MD ordered discharge. Staff assisted patient into street clothes. Belongings gathered. IV pulled. Discharge instructions handed to transport on their arrival. Patient transferred to care of EMS for transport to [facility].

## 2023-01-12 NOTE — PLAN OF CARE
Pt A/Ox4. Positive for orthostatic BP/HR, denies lightheadedness, dizziness, change of vision, nausea, chest pains, or SOB. PTA metoprolol and amlodipine being held. Compression stockings on. Urinal voiding during the night. Ax1/walker/GB. IV Ceftriaxone for possible UTI. Attempted to keep pt's bed 30-40 degrees per MD. Pt likely to discharge today to [facility] TCU pending insurance authorization.

## 2023-01-12 NOTE — PROGRESS NOTES
Care Management Follow Up    Length of Stay (days): 4    Expected Discharge Date: 01/12/2023     Concerns to be Addressed:       Patient plan of care discussed at interdisciplinary rounds: Yes    Anticipated Discharge Disposition: TCU     Anticipated Discharge Services: None  Anticipated Discharge DME:      Patient/family educated on Medicare website which has current facility and service quality ratings:    Education Provided on the Discharge Plan:    Patient/Family in Agreement with the Plan: yes    Referrals Placed by CM/SW:    Private pay costs discussed: Not applicable    Additional Information:  Call from Alla at [facility] reporting they received the auth from [employer]. Opal reports they can take pt after 2 pm today. REJI called  transport and set up a wheelchair transport for 3:15 pm. REJI updated Reesa at [facility] of the time. Opal states that pt's orders will need an order for a CPAP. REJI updated the physician sticky notes. REJI will speak with pt regarding discharge. REJI did page provider regarding discharge and he believes pt will be ready, but still needs to see him face-to-face. If pt is not ready today, REJI will reschedule ride for tomorrow. Auth number [id number] and it is valid 1/12-1/19.      JENNIFER Perry

## 2023-01-12 NOTE — PROGRESS NOTES
Care Management Discharge Note    Discharge Date: 01/12/2023       Discharge Disposition: Transitional Care    Discharge Services: None    Discharge DME:      Discharge Transportation: family or friend will provide    Private pay costs discussed: transportation costs    PAS Confirmation Code: [id number]  Patient/family educated on Medicare website which has current facility and service quality ratings:      Education Provided on the Discharge Plan:    Persons Notified of Discharge Plans: pt, [facility] admissions, charge nurse  Patient/Family in Agreement with the Plan: yes    Handoff Referral Completed: Yes    Additional Information: Pt to discharge to [facility] today at 3:15 pm via  w\c transport. REJI faxed orders. [facility] called and asked for a cpap order. REJI paged provider. Orders have been faxed but will need to be faxed again once updated with cpap orders. Call back from provider who reports he will add an order for CPAP overnight. SW will refax orders.       PAS-RR    D: Per DHS regulation, SW completed and submitted PAS-RR to [place] [employer] via the Senior [employer] Line.  PAS-RR confirmation # is : [id number]    I: REJI spoke with pt and they are aware a PAS-RR has been submitted.  REJI reviewed with pt that they may be contacted for a follow up appointment within 10 days of hospital discharge if their SNF stay is < 30 days.  Contact information for [facility] LinkAge Line was also provided.    A: Pt verbalized understanding.    P: Further questions may be directed to Senior [employer] Line at #[phone], option #4 for PAS-RR staff.          JENNIFER Perry

## 2023-12-01 ENCOUNTER — APPOINTMENT (OUTPATIENT)
Dept: CT IMAGING | Facility: CLINIC | Age: 78
End: 2023-12-01
Attending: EMERGENCY MEDICINE
Payer: COMMERCIAL

## 2023-12-01 ENCOUNTER — HOSPITAL ENCOUNTER (EMERGENCY)
Facility: CLINIC | Age: 78
Discharge: HOME | End: 2023-12-01
Attending: EMERGENCY MEDICINE | Admitting: EMERGENCY MEDICINE
Payer: COMMERCIAL

## 2023-12-01 VITALS
OXYGEN SATURATION: 99 % | RESPIRATION RATE: 16 BRPM | SYSTOLIC BLOOD PRESSURE: 131 MMHG | TEMPERATURE: 98.6 F | HEART RATE: 88 BPM | DIASTOLIC BLOOD PRESSURE: 100 MMHG
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
## 2023-12-01 DIAGNOSIS — S00.83XA FOREHEAD CONTUSION, INITIAL ENCOUNTER: ICD-10-CM

## 2023-12-01 PROCEDURE — 72125 CT NECK SPINE W/O DYE: CPT

## 2023-12-01 PROCEDURE — 99284 EMERGENCY DEPT VISIT MOD MDM: CPT | Mod: 25

## 2023-12-01 PROCEDURE — 70450 CT HEAD/BRAIN W/O DYE: CPT

## 2023-12-01 ASSESSMENT — ACTIVITIES OF DAILY LIVING (ADL)
ADLS_ACUITY_SCORE: 35
ADLS_ACUITY_SCORE: 35

## 2023-12-02 NOTE — DISCHARGE INSTRUCTIONS
Ice to reduce swelling  Tylenol for pain as needed  Take it easy and follow up with your doctor next week

## 2023-12-02 NOTE — ED PROVIDER NOTES
History     Chief Complaint:  Fall       HPI   Jan Hernandez is a 78 year old male who presents after a fall while carrying 2 grocery bags and using a cane.  He stated that he tripped over a pothole.  He fell and hit the left side of his head but denies any loss of consciousness.  He states that sometimes he gets a little wobbly with walking which is why he uses a cane.  Patient does live independently.  He denies any blood thinner use.  Denies any neck pain or any other injuries.  Currently he only has pain in the forehead.      Independent Historian:   None - Patient Only    Review of External Notes:   none       Medications:    aspirin (ASA) 81 MG EC tablet  buPROPion (WELLBUTRIN XL) 300 MG 24 hr tablet  escitalopram (LEXAPRO) 10 MG tablet  fludrocortisone (FLORINEF) 0.1 MG tablet  glimepiride (AMARYL) 1 MG tablet  midodrine (PROAMATINE) 5 MG tablet  Multiple Vitamins-Minerals (PRESERVISION AREDS) TABS  omeprazole (PRILOSEC) 20 MG DR capsule  ondansetron (ZOFRAN) 4 MG tablet  polyethylene glycol (MIRALAX) 17 g packet  rosuvastatin (CRESTOR) 40 MG tablet  sulfaSALAzine (AZULFIDINE) 500 MG tablet        Past Medical History:    Past Medical History:   Diagnosis Date    Anemia due to stage 3 chronic kidney disease (H) 3/18/2019    Chronic diastolic heart failure (H) 6/11/2020    Crohn's disease (H) 1/8/2023    Dextrocardia 1/8/2023    Diabetes (H)     Hyperlipidemia with target low density lipoprotein (LDL) cholesterol less than 70 mg/dL 1/8/2023    Myelopathy (H) 3/29/2022    Peripheral vascular disease (H) 3/29/2022    Type 2 diabetes mellitus, controlled (H) 6/8/2006       Past Surgical History:    Past Surgical History:   Procedure Laterality Date    CHOLECYSTECTOMY          Physical Exam   Patient Vitals for the past 24 hrs:   BP Temp Temp src Pulse Resp SpO2   12/01/23 1803 (!) 131/100 98.6  F (37  C) Oral 88 16 99 %        Physical Exam  GEN- alert, cooperative  HEENT-left forehead above the left eyebrow  with a small hematoma, PERRL, EOMI, MMM, oral pharynx without abnormalities, no dental injuries, midface stable, TM's clear bilaterally  NECK- ROM, soft, supple, no midline C spine tenderness to palpation, no abrasions  RESP- CTAB, no w/r/r, chest wall nontender, no crepitus, symmetrical chest wall movement  CV- RRR, no m/r/g  ABD- soft, NT/ND, +BS  MSK- normal ROM in all extremities, no T and L spinal tenderness in the midline, 5/5 strength in all extremities  NEURO- GCS 15, speech normal, alert, 5/5 strength x 4, sensation to light touch intact in all extremities,  strong bilaterally  PSYCH- normal mood, normal behavior, normal thought process      Emergency Department Course       Imaging:  CT Cervical Spine w/o Contrast   Final Result   IMPRESSION:   HEAD CT:   1.  No CT evidence for acute intracranial process.   2.  Brain atrophy and presumed chronic microvascular ischemic changes as above.      CERVICAL SPINE CT:   1.  No CT evidence for acute fracture or post traumatic subluxation.   2.  Spondylosis as above.      CT Head w/o Contrast   Final Result   IMPRESSION:   HEAD CT:   1.  No CT evidence for acute intracranial process.   2.  Brain atrophy and presumed chronic microvascular ischemic changes as above.      CERVICAL SPINE CT:   1.  No CT evidence for acute fracture or post traumatic subluxation.   2.  Spondylosis as above.           Emergency Department Course & Assessments:       Interventions:  Medications - No data to display     Assessments:  1817 Exam    Independent Interpretation (X-rays, CTs, rhythm strip):  None    Consultations/Discussion of Management or Tests:  None        Social Determinants of Health affecting care:   None    Disposition:  The patient was discharged to home.     Impression & Plan        Medical Decision Making:  Patient presents today after fall while he was carrying grocery bags.  Patient was In a c-collar given the mechanism of fall.  So far CT showed no acute  injuries.  He does have a hematoma but with a mild abrasion.  He is advised to take it easy and follow-up with his doctor in a week.  He is asked to use ice to reduce swelling.  I asked him to use Tylenol for pain as needed.  Return precaution provided.  Patient discharged in stable condition.      Diagnosis:    ICD-10-CM    1. Forehead contusion, initial encounter  S00.83XA       2. Fall, initial encounter  W19.XXXA                12/1/2023   Ramirez Maria MD Cheng, Wenlan, MD  12/01/23 9404

## 2023-12-02 NOTE — ED TRIAGE NOTES
"Pt comes from [facility]. He was carrying in 2 bags of groceries and tripped with 1 foot in a pothole. Fell and hit left side of head above eyebrown on the ground. Needed help getting up after, but pt states his \"legs dont always work right but this is normal\" normally walks with a cane. Lives independently. No LOC, no thinners. Bystanders called EMS. Has bruise/abrasion to left head above eye. Denies any other sx     Triage Assessment (Adult)       Row Name 12/01/23 4076          Triage Assessment    Airway WDL WDL        Respiratory WDL    Respiratory WDL WDL        Skin Circulation/Temperature WDL    Skin Circulation/Temperature WDL X  large bruise/abrasion to left forhead        Peripheral/Neurovascular WDL    Peripheral Neurovascular WDL WDL        Cognitive/Neuro/Behavioral WDL    Cognitive/Neuro/Behavioral WDL WDL                     "